# Patient Record
Sex: FEMALE | Race: WHITE | NOT HISPANIC OR LATINO
[De-identification: names, ages, dates, MRNs, and addresses within clinical notes are randomized per-mention and may not be internally consistent; named-entity substitution may affect disease eponyms.]

---

## 2018-05-21 PROBLEM — Z00.00 ENCOUNTER FOR PREVENTIVE HEALTH EXAMINATION: Status: ACTIVE | Noted: 2018-05-21

## 2018-05-23 ENCOUNTER — APPOINTMENT (OUTPATIENT)
Dept: SURGERY | Facility: CLINIC | Age: 83
End: 2018-05-23

## 2020-02-09 ENCOUNTER — TRANSCRIPTION ENCOUNTER (OUTPATIENT)
Age: 85
End: 2020-02-09

## 2020-02-09 ENCOUNTER — INPATIENT (INPATIENT)
Facility: HOSPITAL | Age: 85
LOS: 0 days | Discharge: HOME HEALTH | DRG: 202 | End: 2020-02-10
Attending: INTERNAL MEDICINE | Admitting: INTERNAL MEDICINE
Payer: MEDICARE

## 2020-02-09 VITALS
RESPIRATION RATE: 22 BRPM | OXYGEN SATURATION: 93 % | TEMPERATURE: 98 F | DIASTOLIC BLOOD PRESSURE: 71 MMHG | WEIGHT: 138.89 LBS | HEIGHT: 59 IN | HEART RATE: 67 BPM | SYSTOLIC BLOOD PRESSURE: 131 MMHG

## 2020-02-09 DIAGNOSIS — Z29.9 ENCOUNTER FOR PROPHYLACTIC MEASURES, UNSPECIFIED: ICD-10-CM

## 2020-02-09 DIAGNOSIS — J06.9 ACUTE UPPER RESPIRATORY INFECTION, UNSPECIFIED: ICD-10-CM

## 2020-02-09 DIAGNOSIS — Z95.0 PRESENCE OF CARDIAC PACEMAKER: ICD-10-CM

## 2020-02-09 DIAGNOSIS — E78.5 HYPERLIPIDEMIA, UNSPECIFIED: ICD-10-CM

## 2020-02-09 DIAGNOSIS — Z95.0 PRESENCE OF CARDIAC PACEMAKER: Chronic | ICD-10-CM

## 2020-02-09 DIAGNOSIS — E03.9 HYPOTHYROIDISM, UNSPECIFIED: ICD-10-CM

## 2020-02-09 DIAGNOSIS — I10 ESSENTIAL (PRIMARY) HYPERTENSION: ICD-10-CM

## 2020-02-09 DIAGNOSIS — I50.9 HEART FAILURE, UNSPECIFIED: ICD-10-CM

## 2020-02-09 DIAGNOSIS — I48.91 UNSPECIFIED ATRIAL FIBRILLATION: ICD-10-CM

## 2020-02-09 LAB
ALBUMIN SERPL ELPH-MCNC: 2.8 G/DL — LOW (ref 3.3–5)
ALP SERPL-CCNC: 122 U/L — HIGH (ref 40–120)
ALT FLD-CCNC: 26 U/L — SIGNIFICANT CHANGE UP (ref 12–78)
ANION GAP SERPL CALC-SCNC: 6 MMOL/L — SIGNIFICANT CHANGE UP (ref 5–17)
APTT BLD: 29.8 SEC — SIGNIFICANT CHANGE UP (ref 28.5–37)
AST SERPL-CCNC: 39 U/L — HIGH (ref 15–37)
BASOPHILS # BLD AUTO: 0 K/UL — SIGNIFICANT CHANGE UP (ref 0–0.2)
BASOPHILS NFR BLD AUTO: 0 % — SIGNIFICANT CHANGE UP (ref 0–2)
BILIRUB SERPL-MCNC: 1 MG/DL — SIGNIFICANT CHANGE UP (ref 0.2–1.2)
BUN SERPL-MCNC: 19 MG/DL — SIGNIFICANT CHANGE UP (ref 7–23)
CALCIUM SERPL-MCNC: 9.1 MG/DL — SIGNIFICANT CHANGE UP (ref 8.5–10.1)
CHLORIDE SERPL-SCNC: 103 MMOL/L — SIGNIFICANT CHANGE UP (ref 96–108)
CK SERPL-CCNC: 58 U/L — SIGNIFICANT CHANGE UP (ref 26–192)
CO2 SERPL-SCNC: 29 MMOL/L — SIGNIFICANT CHANGE UP (ref 22–31)
CREAT SERPL-MCNC: 1.1 MG/DL — SIGNIFICANT CHANGE UP (ref 0.5–1.3)
EOSINOPHIL # BLD AUTO: 0 K/UL — SIGNIFICANT CHANGE UP (ref 0–0.5)
EOSINOPHIL NFR BLD AUTO: 0 % — SIGNIFICANT CHANGE UP (ref 0–6)
FLU A RESULT: SIGNIFICANT CHANGE UP
FLU A RESULT: SIGNIFICANT CHANGE UP
FLUAV AG NPH QL: SIGNIFICANT CHANGE UP
FLUBV AG NPH QL: SIGNIFICANT CHANGE UP
GLUCOSE SERPL-MCNC: 94 MG/DL — SIGNIFICANT CHANGE UP (ref 70–99)
HCT VFR BLD CALC: 44.3 % — SIGNIFICANT CHANGE UP (ref 34.5–45)
HGB BLD-MCNC: 13.9 G/DL — SIGNIFICANT CHANGE UP (ref 11.5–15.5)
INR BLD: 1.34 RATIO — HIGH (ref 0.88–1.16)
LACTATE SERPL-SCNC: 1.4 MMOL/L — SIGNIFICANT CHANGE UP (ref 0.7–2)
LYMPHOCYTES # BLD AUTO: 0.76 K/UL — LOW (ref 1–3.3)
LYMPHOCYTES # BLD AUTO: 15 % — SIGNIFICANT CHANGE UP (ref 13–44)
MANUAL SMEAR VERIFICATION: SIGNIFICANT CHANGE UP
MCHC RBC-ENTMCNC: 29.5 PG — SIGNIFICANT CHANGE UP (ref 27–34)
MCHC RBC-ENTMCNC: 31.4 GM/DL — LOW (ref 32–36)
MCV RBC AUTO: 94.1 FL — SIGNIFICANT CHANGE UP (ref 80–100)
MONOCYTES # BLD AUTO: 0.76 K/UL — SIGNIFICANT CHANGE UP (ref 0–0.9)
MONOCYTES NFR BLD AUTO: 15 % — HIGH (ref 2–14)
NEUTROPHILS # BLD AUTO: 3.56 K/UL — SIGNIFICANT CHANGE UP (ref 1.8–7.4)
NEUTROPHILS NFR BLD AUTO: 67 % — SIGNIFICANT CHANGE UP (ref 43–77)
NEUTS BAND # BLD: 3 % — SIGNIFICANT CHANGE UP (ref 0–8)
NRBC # BLD: 0 — SIGNIFICANT CHANGE UP
NRBC # BLD: SIGNIFICANT CHANGE UP /100 WBCS (ref 0–0)
NT-PROBNP SERPL-SCNC: 1454 PG/ML — HIGH (ref 0–450)
PLAT MORPH BLD: NORMAL — SIGNIFICANT CHANGE UP
PLATELET # BLD AUTO: 179 K/UL — SIGNIFICANT CHANGE UP (ref 150–400)
POTASSIUM SERPL-MCNC: 4.6 MMOL/L — SIGNIFICANT CHANGE UP (ref 3.5–5.3)
POTASSIUM SERPL-SCNC: 4.6 MMOL/L — SIGNIFICANT CHANGE UP (ref 3.5–5.3)
PROT SERPL-MCNC: 7.7 G/DL — SIGNIFICANT CHANGE UP (ref 6–8.3)
PROTHROM AB SERPL-ACNC: 15.2 SEC — HIGH (ref 10–12.9)
RBC # BLD: 4.71 M/UL — SIGNIFICANT CHANGE UP (ref 3.8–5.2)
RBC # FLD: 14.8 % — HIGH (ref 10.3–14.5)
RBC BLD AUTO: NORMAL — SIGNIFICANT CHANGE UP
RSV RESULT: SIGNIFICANT CHANGE UP
RSV RNA RESP QL NAA+PROBE: SIGNIFICANT CHANGE UP
SODIUM SERPL-SCNC: 138 MMOL/L — SIGNIFICANT CHANGE UP (ref 135–145)
TROPONIN I SERPL-MCNC: 0.02 NG/ML — SIGNIFICANT CHANGE UP (ref 0.01–0.04)
WBC # BLD: 5.08 K/UL — SIGNIFICANT CHANGE UP (ref 3.8–10.5)
WBC # FLD AUTO: 5.08 K/UL — SIGNIFICANT CHANGE UP (ref 3.8–10.5)

## 2020-02-09 PROCEDURE — 71045 X-RAY EXAM CHEST 1 VIEW: CPT | Mod: 26

## 2020-02-09 PROCEDURE — 99285 EMERGENCY DEPT VISIT HI MDM: CPT

## 2020-02-09 PROCEDURE — 93010 ELECTROCARDIOGRAM REPORT: CPT

## 2020-02-09 RX ORDER — FUROSEMIDE 40 MG
40 TABLET ORAL DAILY
Refills: 0 | Status: DISCONTINUED | OUTPATIENT
Start: 2020-02-09 | End: 2020-02-10

## 2020-02-09 RX ORDER — FOLIC ACID 0.8 MG
1 TABLET ORAL DAILY
Refills: 0 | Status: DISCONTINUED | OUTPATIENT
Start: 2020-02-09 | End: 2020-02-10

## 2020-02-09 RX ORDER — FUROSEMIDE 40 MG
1 TABLET ORAL
Qty: 0 | Refills: 0 | DISCHARGE

## 2020-02-09 RX ORDER — DILTIAZEM HCL 120 MG
120 CAPSULE, EXT RELEASE 24 HR ORAL DAILY
Refills: 0 | Status: DISCONTINUED | OUTPATIENT
Start: 2020-02-09 | End: 2020-02-09

## 2020-02-09 RX ORDER — METOPROLOL TARTRATE 50 MG
200 TABLET ORAL DAILY
Refills: 0 | Status: DISCONTINUED | OUTPATIENT
Start: 2020-02-09 | End: 2020-02-09

## 2020-02-09 RX ORDER — FUROSEMIDE 40 MG
40 TABLET ORAL DAILY
Refills: 0 | Status: DISCONTINUED | OUTPATIENT
Start: 2020-02-09 | End: 2020-02-09

## 2020-02-09 RX ORDER — DILTIAZEM HCL 120 MG
0 CAPSULE, EXT RELEASE 24 HR ORAL
Qty: 0 | Refills: 0 | DISCHARGE

## 2020-02-09 RX ORDER — LEVOTHYROXINE SODIUM 125 MCG
0 TABLET ORAL
Qty: 0 | Refills: 0 | DISCHARGE

## 2020-02-09 RX ORDER — FOLIC ACID 0.8 MG
0 TABLET ORAL
Qty: 0 | Refills: 0 | DISCHARGE

## 2020-02-09 RX ORDER — APIXABAN 2.5 MG/1
0 TABLET, FILM COATED ORAL
Qty: 0 | Refills: 0 | DISCHARGE

## 2020-02-09 RX ORDER — APIXABAN 2.5 MG/1
2.5 TABLET, FILM COATED ORAL EVERY 12 HOURS
Refills: 0 | Status: DISCONTINUED | OUTPATIENT
Start: 2020-02-09 | End: 2020-02-10

## 2020-02-09 RX ORDER — LEVOTHYROXINE SODIUM 125 MCG
100 TABLET ORAL DAILY
Refills: 0 | Status: DISCONTINUED | OUTPATIENT
Start: 2020-02-09 | End: 2020-02-10

## 2020-02-09 RX ORDER — FUROSEMIDE 40 MG
0 TABLET ORAL
Qty: 0 | Refills: 0 | DISCHARGE

## 2020-02-09 RX ORDER — METOPROLOL TARTRATE 50 MG
1 TABLET ORAL
Qty: 0 | Refills: 0 | DISCHARGE

## 2020-02-09 RX ORDER — OMEPRAZOLE 10 MG/1
1 CAPSULE, DELAYED RELEASE ORAL
Qty: 0 | Refills: 0 | DISCHARGE

## 2020-02-09 RX ORDER — ALBUTEROL 90 UG/1
1 AEROSOL, METERED ORAL EVERY 6 HOURS
Refills: 0 | Status: DISCONTINUED | OUTPATIENT
Start: 2020-02-09 | End: 2020-02-10

## 2020-02-09 RX ORDER — FUROSEMIDE 40 MG
40 TABLET ORAL ONCE
Refills: 0 | Status: COMPLETED | OUTPATIENT
Start: 2020-02-09 | End: 2020-02-09

## 2020-02-09 RX ORDER — LOVASTATIN 20 MG
0 TABLET ORAL
Qty: 0 | Refills: 0 | DISCHARGE

## 2020-02-09 RX ORDER — SODIUM CHLORIDE 9 MG/ML
3 INJECTION INTRAMUSCULAR; INTRAVENOUS; SUBCUTANEOUS ONCE
Refills: 0 | Status: COMPLETED | OUTPATIENT
Start: 2020-02-09 | End: 2020-02-09

## 2020-02-09 RX ORDER — LEVOTHYROXINE SODIUM 125 MCG
1 TABLET ORAL
Qty: 0 | Refills: 0 | DISCHARGE

## 2020-02-09 RX ORDER — FOLIC ACID 0.8 MG
1 TABLET ORAL
Qty: 0 | Refills: 0 | DISCHARGE

## 2020-02-09 RX ORDER — ATORVASTATIN CALCIUM 80 MG/1
10 TABLET, FILM COATED ORAL AT BEDTIME
Refills: 0 | Status: DISCONTINUED | OUTPATIENT
Start: 2020-02-09 | End: 2020-02-10

## 2020-02-09 RX ORDER — METOPROLOL TARTRATE 50 MG
200 TABLET ORAL DAILY
Refills: 0 | Status: DISCONTINUED | OUTPATIENT
Start: 2020-02-09 | End: 2020-02-10

## 2020-02-09 RX ORDER — PANTOPRAZOLE SODIUM 20 MG/1
40 TABLET, DELAYED RELEASE ORAL
Refills: 0 | Status: DISCONTINUED | OUTPATIENT
Start: 2020-02-09 | End: 2020-02-10

## 2020-02-09 RX ORDER — IPRATROPIUM/ALBUTEROL SULFATE 18-103MCG
3 AEROSOL WITH ADAPTER (GRAM) INHALATION EVERY 6 HOURS
Refills: 0 | Status: DISCONTINUED | OUTPATIENT
Start: 2020-02-09 | End: 2020-02-09

## 2020-02-09 RX ORDER — POTASSIUM CHLORIDE 20 MEQ
1 PACKET (EA) ORAL
Qty: 0 | Refills: 0 | DISCHARGE

## 2020-02-09 RX ORDER — IPRATROPIUM/ALBUTEROL SULFATE 18-103MCG
3 AEROSOL WITH ADAPTER (GRAM) INHALATION THREE TIMES A DAY
Refills: 0 | Status: DISCONTINUED | OUTPATIENT
Start: 2020-02-09 | End: 2020-02-10

## 2020-02-09 RX ORDER — DILTIAZEM HCL 120 MG
120 CAPSULE, EXT RELEASE 24 HR ORAL DAILY
Refills: 0 | Status: DISCONTINUED | OUTPATIENT
Start: 2020-02-09 | End: 2020-02-10

## 2020-02-09 RX ADMIN — Medication 600 MILLIGRAM(S): at 21:22

## 2020-02-09 RX ADMIN — SODIUM CHLORIDE 3 MILLILITER(S): 9 INJECTION INTRAMUSCULAR; INTRAVENOUS; SUBCUTANEOUS at 16:00

## 2020-02-09 RX ADMIN — ATORVASTATIN CALCIUM 10 MILLIGRAM(S): 80 TABLET, FILM COATED ORAL at 22:39

## 2020-02-09 RX ADMIN — Medication 3 MILLILITER(S): at 21:22

## 2020-02-09 RX ADMIN — Medication 125 MILLIGRAM(S): at 20:51

## 2020-02-09 RX ADMIN — Medication 40 MILLIGRAM(S): at 17:22

## 2020-02-09 RX ADMIN — APIXABAN 2.5 MILLIGRAM(S): 2.5 TABLET, FILM COATED ORAL at 21:15

## 2020-02-09 RX ADMIN — Medication 100 MILLIGRAM(S): at 23:05

## 2020-02-09 NOTE — ED PROVIDER NOTE - OBJECTIVE STATEMENT
89 yo F with hx CHF p/w cough, congestion x past ~ 4 days. Now with inc dyspnea today, wheezing. no chest pain. no abd pain . no n/v/d./ no neck / back pain. no recent trauma. no recent travel / immob. Pt seen by MD montero and started on amoxicillin, now with worsening sx. no recent trauma. no recent travel / immob. no agg/allev factors. NO other inj or co.

## 2020-02-09 NOTE — CONSULT NOTE ADULT - SUBJECTIVE AND OBJECTIVE BOX
[facility] Cardiology    CHIEF COMPLAINT: Patient is a 89y old  Female  PMH AFIB on Eliquis, HTN CHOL, diastolic chf who presents with a chief complaint of cough, phlegm, colored sputum x 2 days. Denies CP or pressure. Recently started on Amoxicillin 2 days ago from PMD for URI symptoms. Pt did not take her lasix dose today (has been taking lasix 40 mg MWFS, 20 mg TTS).    HPI:      PAST MEDICAL & SURGICAL HISTORY:  HLD (hyperlipidemia)  HTN (hypertension)  Afib  CHF (congestive heart failure)  Artificial pacemaker    SOCIAL HISTORY: no tobacco    FAMILY HISTORY: n/c      MEDICATIONS  (STANDING):  furosemide   Injectable 40 milliGRAM(s) IV Push Once    MEDICATIONS  (PRN):      Allergies    No Known Allergies    Intolerances      REVIEW OF SYSTEMS:  CONSTITUTIONAL:  weakness, no fevers   EYES/ENT: No visual changes  NECK: No pain or stiffness  RESPIRATORY: shortness of breath  CARDIOVASCULAR: No chest pain or palpitations  GASTROINTESTINAL: No abdominal pain  GENITOURINARY: No hematuria  NEUROLOGICAL: No weakness  SKIN: No rash  All other review of systems is negative unless indicated above    VITAL SIGNS:   Vital Signs Last 24 Hrs  T(C): 36.9 (09 Feb 2020 16:25), Max: 36.9 (09 Feb 2020 16:25)  T(F): 98.5 (09 Feb 2020 16:25), Max: 98.5 (09 Feb 2020 16:25)  HR: 65 (09 Feb 2020 16:25) (65 - 67)  BP: 117/70 (09 Feb 2020 16:25) (117/70 - 131/71)  BP(mean): --  RR: 24 (09 Feb 2020 16:25) (22 - 24)  SpO2: 95% (09 Feb 2020 16:25) (93% - 95%)  I&O's Summary    PHYSICAL EXAM:  Constitutional: NAD  Neurological: Alert and oriented  HEENT: EOMI, no JVD  Cardiovascular: S1 and S2 irr  Pulmonary: breath sounds bilaterally coarse rhonchi, wheeze b/l L>R  Gastrointestinal: Bowel Sounds present, soft, nontender  Ext: peripheral edema neg  Skin: No rashes, No cyanosis.  Psych:  Mood & affect appropriate   LABS: All Labs Reviewed:                        13.9   5.08  )-----------( 179      ( 09 Feb 2020 16:17 )             44.3     02-09    138  |  103  |  19  ----------------------------<  94  4.6   |  29  |  1.10    Ca    9.1      09 Feb 2020 16:17    TPro  7.7  /  Alb  2.8<L>  /  TBili  1.0  /  DBili  x   /  AST  39<H>  /  ALT  26  /  AlkPhos  122<H>  02-09    PT/INR - ( 09 Feb 2020 16:17 )   PT: 15.2 sec;   INR: 1.34 ratio       CHIEF COMPLAINT: Patient is a 89y old  Female  PMH AFIB on Eliquis, HTN CHOL, diastolic chf who presents with a chief complaint of cough, phlegm, colored sputum x 2 days. Denies CP or pressure. Recently started on Amoxicillin 2 days ago from PMD for URI symptoms. Pt did not take her lasix dose today (has been taking lasix 40 mg MWFS, 20 mg TTS).    PTT - ( 09 Feb 2020 16:17 )  PTT:29.8 sec  CARDIAC MARKERS ( 09 Feb 2020 16:17 )  .016 ng/mL / x     / 58 U/L / x     / x        Blood Culture:   02-09 @ 16:17  Pro Bnp 1454    RADIOLOGY/EKG: AF with chronic ST-T wave abnormalities, septal infarct pattern t waves chronic    ? PNA with superimposed chf  PLAN  would give 40 mg IV lasix x1  F/U CXR  Pan CX  Pt well known to our office/ Dr. Rogers  Pt appears reluctant to stay but I recommend admission

## 2020-02-09 NOTE — H&P ADULT - HISTORY OF PRESENT ILLNESS
89F w/pmh of Afib on Eliquis, CHF, PPM, HTN, HLD presenting with cough, congestion and wheezing x 4 days.     In the ED, patient's vitals were: T97.5F, HR 67, /71, RR 22 and SpO2 93% RA. Significant labs include: cbc, cmp wnl. BNP 1454. Flu/RSV negative. Trop .016  Pt received: Solumedrol 125mg IV x1, Lasix 40mg IVx1. Blood cx collected.   CXR:   EKG: 89F w/pmh of Afib on Eliquis, CHF, s/p PPM, HTN, HLD presenting with cough, congestion and wheezing x 4 days. Patient states that since Wednesday she has been having a runny nose and productive cough that has been progressively getting worse.  Patient has allergic rhinitis however states that this was different as her nose was leaking green colored discharge.  She then went to her PCP and was prescribed amoxicillin and tessalon perles which she has been taking for 1 day however this morning, daughter at bedside states she was more weak and wheezing therefore they decided to bring her in.  Patient has cough baseline however this is worse and productive of sputum and states that she feels like something is stuck in her throat.  Denies recent travel, post nasal drip, chest pain, SOB (other than baseline), abd pain, diarrhea/ constipation, nausea/ vomiting, muscle aches/ pains or sick contacts.  Patient admits to some chest discomfort a few days ago when she was getting up from chair but feels that she was straining herself and it resolved, has not had pain since then.  Denies chest pain or SOB with ambulation.  She is able to sleep flat without orthopnea however sleeps on sides as it is more comfortable.  She is on lasix at home, however did not take her dose today.  Family states that she likes chinese food and junk food however for the past month has been very diligent about what she is eating and maintaining low salt diet.  Patient also states that about 3 weeks ago, her legs "buckled" and she fell on her b/l knees, was able to get up and walk around, since then has been getting PT and now has [facility] set up.      In the ED, patient's vitals were: T97.5F, HR 67, /71, RR 22 and SpO2 93% RA. Significant labs include: cbc, cmp wnl. BNP 1454. Flu/RSV negative. Trop .016  Pt received: Solumedrol 125mg IV x1, Lasix 40mg IVx1. Blood cx collected.   CXR: blunting of L costophrenic angle unchanged from before, RLL haziness present unchanged from before (wet read)  EKG: NSR with old ST abnormalities and TWI and regular PVCs 89F w/pmh of Afib on Eliquis, CHF, s/p PPM, HTN, HLD, hypothyroidism presenting with cough, congestion and wheezing x 4 days. Patient states that since Wednesday she has been having a runny nose and productive cough that has been progressively getting worse.  Patient has allergic rhinitis however states that this was different as her nose was leaking green colored discharge.  She then went to her PCP and was prescribed amoxicillin and tessalon perles which she has been taking for 1 day however this morning, daughter at bedside states she was more weak and wheezing therefore they decided to bring her in.  Patient has cough baseline however this is worse and productive of sputum and states that she feels like something is stuck in her throat.  Denies recent travel, post nasal drip, chest pain, SOB (other than baseline), abd pain, diarrhea/ constipation, nausea/ vomiting, muscle aches/ pains or sick contacts.  Patient admits to some chest discomfort a few days ago when she was getting up from chair but feels that she was straining herself and it resolved, has not had pain since then.  Denies chest pain or SOB with ambulation.  She is able to sleep flat without orthopnea however sleeps on sides as it is more comfortable.  She is on lasix at home, however did not take her dose today.  Family states that she likes chinese food and junk food however for the past month has been very diligent about what she is eating and maintaining low salt diet.  Patient also states that about 3 weeks ago, her legs "buckled" and she fell on her b/l knees, was able to get up and walk around, since then has been getting PT and now has [facility] set up.      In the ED, patient's vitals were: T97.5F, HR 67, /71, RR 22 and SpO2 93% RA. Significant labs include: cbc, cmp wnl. BNP 1454. Flu/RSV negative. Trop .016  Pt received: Solumedrol 125mg IV x1, Lasix 40mg IVx1. Blood cx collected.   CXR: blunting of L costophrenic angle unchanged from before, RLL haziness present unchanged from before (wet read)  EKG: NSR with old ST abnormalities and TWI and regular PVCs 89F w/pmh of Afib on Eliquis,  Diastolic CHF, s/p PPM, HTN, HLD, hypothyroidism presenting with cough, congestion and wheezing x 4 days. Patient states that since Wednesday she has been having a runny nose and productive cough that has been progressively getting worse.  Patient has allergic rhinitis however states that this was different as her nose was leaking green colored discharge.  She then went to her PCP and was prescribed amoxicillin and tessalon pearls which she has been taking for 1 day however this morning, daughter at bedside states she was more weak and wheezing therefore they decided to bring her in.  Patient has cough baseline however this is worse and productive of sputum and states that she feels like something is stuck in her throat.  Denies recent travel, post nasal drip, chest pain, SOB (other than baseline), abd pain, diarrhea/ constipation, nausea/ vomiting, muscle aches/ pains or sick contacts.  Patient admits to some chest discomfort a few days ago when she was getting up from chair but feels that she was straining herself and it resolved, has not had pain since then.  Denies chest pain or SOB with ambulation.  She is able to sleep flat without orthopnea however sleeps on sides as it is more comfortable.  She is on lasix at home, however did not take her dose today.  Family states that she likes chinese food and junk food however for the past month has been very diligent about what she is eating and maintaining low salt diet.  Patient also states that about 3 weeks ago, her legs "buckled" and she fell on her b/l knees, was able to get up and walk around, since then has been getting PT and now has [facility] set up.      In the ED, patient's vitals were: T97.5F, HR 67, /71, RR 22 and SpO2 93% RA. Significant labs include: cbc, cmp wnl. BNP 1454. Flu/RSV negative. Trop .016  Pt received: Solumedrol 125mg IV x1, Lasix 40mg IVx1. Blood cx collected.   CXR: blunting of L costophrenic angle unchanged from before, RLL haziness present unchanged from before (wet read)  EKG: NSR with old ST abnormalities and TWI and regular PVCs 89F w/pmh of Afib on Eliquis,  Diastolic CHF, s/p PPM, HTN, HLD, hypothyroidism, Hx of ERCP and sphincterotomy (2018) for multiple stones, duodenal cyst presenting with cough, congestion and wheezing x 4 days. Patient states that since Wednesday she has been having a runny nose and productive cough that has been progressively getting worse.  Patient has allergic rhinitis however states that this was different as her nose was leaking green colored discharge.  She then went to her PCP and was prescribed amoxicillin and tessalon pearls which she has been taking for 1 day however this morning, daughter at bedside states she was more weak and wheezing therefore they decided to bring her in.  Patient has cough baseline however this is worse and productive of sputum and states that she feels like something is stuck in her throat.  Denies recent travel, post nasal drip, chest pain, SOB (other than baseline), abd pain, diarrhea/ constipation, nausea/ vomiting, muscle aches/ pains or sick contacts.  Patient admits to some chest discomfort a few days ago when she was getting up from chair but feels that she was straining herself and it resolved, has not had pain since then.  Denies chest pain or SOB with ambulation.  She is able to sleep flat without orthopnea however sleeps on sides as it is more comfortable.  She is on lasix at home, however did not take her dose today.  Family states that she likes chinese food and junk food however for the past month has been very diligent about what she is eating and maintaining low salt diet.  Patient also states that about 3 weeks ago, her legs "buckled" and she fell on her b/l knees, was able to get up and walk around, since then has been getting PT and now has [facility] set up.      Dr. Garcia, patient's outpatient gastroenterologist was called. Patient has had ERCP with sphincterotomy for multiple stones in 2018. Patient also has a history of duodenal diverticulum.       In the ED, patient's vitals were: T97.5F, HR 67, /71, RR 22 and SpO2 93% RA. Significant labs include: cbc, cmp wnl. BNP 1454. Flu/RSV negative. Trop .016  Pt received: Solumedrol 125mg IV x1, Lasix 40mg IVx1. Blood cx collected.   CXR: cardiomegaly; clear lungs  CT Chest Non-contrast (2/10): 1. 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 No rupture. 2. Mild multifocal bronchiolitis in the right lung. 3. Pneumobilia. Correlate for history of ERCP with sphincterotomy or similar biliary intervention. In the absence of such history the presence of gas in the bile ducts may signify cholangitis or juanita-enteric fistula. 4. There is a fluid attenuation lesion at/near the expected position of the common bile duct which may be dilated common bile ducts/choledochal cyst or could be a duodenal diverticulum. EKG: NSR with old ST abnormalities and TWI and regular PVCs.

## 2020-02-09 NOTE — H&P ADULT - PROBLEM SELECTOR PLAN 4
- continue with home Eliquis 2.5mg BID  - continue with home diltiazem 120mg ER and metoprolol succinate 200mg QD with hold parameters  - currently not in afib when admitted  - continue tele monitoring - On cardizem  mg daily, Toprol  mg daily  - continue to monitor routine hemodynamics, well controlled

## 2020-02-09 NOTE — H&P ADULT - ASSESSMENT
89F w/pmh of Afib on Eliquis, CHF, PPM, HTN, HLD presenting with cough, congestion and wheezing admitted with 89F PMH of Afib on Eliquis, CHF, PPM, HTN, HLD presenting with cough, congestion and wheezing admitted with CHF exacerbation and viral infection, with failed outpatient abx. 89F PMH of Afib on Eliquis, CHF, PPM, HTN, HLD, hypothyroidism presenting with cough, congestion and wheezing admitted with CHF exacerbation and viral infection, with failed outpatient abx, r/o PNA. 89F PMH of Afib on Eliquis, CHF, PPM, HTN, HLD, hypothyroidism presenting with cough, congestion and wheezing admitted with Ac on Chronic Diastolic CHF exacerbation and possible viral infection, with failed outpatient abx, r/o PNA.

## 2020-02-09 NOTE — ED PROVIDER NOTE - CARE PLAN
Principal Discharge DX:	Upper respiratory tract infection, unspecified type  Secondary Diagnosis:	Congestive heart failure, unspecified HF chronicity, unspecified heart failure type

## 2020-02-09 NOTE — H&P ADULT - NSHPOUTPATIENTPROVIDERS_GEN_ALL_CORE
[facility]monary Medicine    [address] [address] [place] [id number]    Phone:  [phone]    Fax:  [phone]       Thank You for choosing us for your health care visit. We are glad to serve you and happy to provide you with this summary of your visit. Please help us to ensure we have accurate records. If you find anything that needs to be changed, please let our staff know as soon as possible.          Your Demographic Information     Patient Name Sex Aram Jack Female 1955       Ethnic Group Patient Race    Not of  or  Origin White      Your Visit Details     Date & Time Provider Department    2017 10:30 AM Armando Huizar DO [facility]      Your Upcoming Appointment*(Max 10)     2017 11:00 AM CST   COMPLETE PFT with CANDELARIO PFT   [facility] ([facility])    [address] [place] [id number]   [phone]            2017  8:40 AM CST   Lab Visit with JONI LAB   [facility] ([facility])    [address] [place] [address] [phone]            2017  8:30 AM CDT   Consultation with Siddhartha Martell DO   [facility] ([facility])    [address] [place] [id number] [phone]            Wednesday May 03, 2017  9:45 AM CDT   Follow-up Visit with Armando Huizar DO   [facility] ([facility])    [address] [place] [id number] [phone]            2017  8:00 AM CDT   Follow-up Visit with KENDALL Edouard   [facility] ([facility])    [address] [place] [address] [phone]              Your To Do List     Follow-Up    Return in about 3 months (around 2017) for asthma, PFTs.      We Ordered or Performed the Following     SERVICE TO PULMONARY TESTING       Conditions Discussed  PCP: Carri Moses [phone] Today or Order-Related Diagnoses        Comments    Persistent asthma with undetermined severity    -  Primary     Shortness of breath         Wheezing         Uncontrolled persistent asthma         Obesity (BMI 30-39.9)           Your Vitals Were     BP Pulse Resp Height Weight SpO2    120/78 78 16 5' 5\" (1.651 m) 240 lb (108.9 kg) 98%    BMI Smoking Status                39.94 kg/m2 Former Smoker          Medications Prescribed or Re-Ordered Today     fluticasone-salmeterol (ADVAIR DISKUS) 250-50 MCG/DOSE inhaler    Sig - Route: Inhale 1 puff into the lungs 2 times daily. - Inhalation    Class: Eprescribe    [facility] [id number] [place] [address] AT [facility] Ph #: [phone]      Your Current Medications Are        Disp Refills Start End    montelukast (SINGULAIR) 10 MG tablet        Sig - Route: Take 10 mg by mouth nightly. - Oral    Class: Historical Med    escitalopram (LEXAPRO) 20 MG tablet 45 tablet 2 1/16/2017     Sig - Route: Take 1.5 tablets by mouth daily. - Oral    Class: Eprescribe    fluticasone-salmeterol (ADVAIR DISKUS) 100-50 MCG/DOSE AEROSOL POWDER, BREATH ACTIVATED 1 each 5 10/31/2016     Sig - Route: Inhale 1 puff into the lungs 2 times daily. - Inhalation    Class: Eprescribe    albuterol (PROVENTIL HFA) 108 (90 BASE) MCG/ACT inhaler 2 Inhaler 3 10/15/2015     Sig - Route: Inhale 2 puffs into the lungs every 4 hours as needed for Shortness of Breath or Wheezing. - Inhalation    Class: Eprescribe    aspirin 81 MG tablet        Sig - Route: Take 81 mg by mouth daily. - Oral    Class: Historical Med    fluticasone-salmeterol (ADVAIR DISKUS) 250-50 MCG/DOSE inhaler 1 each 11 1/30/2017 1/30/2018    Sig - Route: Inhale 1 puff into the lungs 2 times daily. - Inhalation    Class: Eprescribe    predniSONE (DELTASONE) 20 MG tablet 30 tablet 2 10/31/2016     Sig: Take 2 tabs po qd for 5 days then 1 tab po qd for 5 days when having an asthma flare/    Class:  Eprescribe      Allergies     Claritin CARDIAC DISTURBANCES    Rapid heart beat    Vicodin Es [Hydrocodone-acetaminophen] MYALGIA    Penicillins HIVES      Immunizations History as of 1/30/2017     Name Date    INFLUENZA QUADRIVALENT 10/31/2016, 10/14/2014    Influenza 10/30/2008    Influenza Quadrivalent Preservative Free 10/15/2015    Pneumococcal Polysaccharide Adult 8/17/2011    Tdap 8/17/2011      Problem List as of 1/30/2017     HLD (hyperlipidemia)    Diverticulitis of colon (without mention of hemorrhage)    Unspecified constipation    Unspecified hemorrhoids without mention of complication              Patient Instructions        PULMONARY  FUNCTION TESTING  PATIENT INSTRUCTIONS    Appointment:   1/31/2017 at 11:00 am  Please arrive at registration at 10:45 am    No inhaler/dilator use 4 hours prior to the exam.    DO NOT use the following medications prior to the exam:        Advair    24 hours prior      Albuterol/Ventolin   6 hours prior      Singulair   24 hours prior        No smoking at least 4 hours prior to the test.    Please check in at the Clinic Registration first, then proceed to the Radiology Desk.    If you are unable to keep your appointment or need to reschedule, please call   scheduling at [phone].        Pulmonary Function Tests  Pulmonary function tests (also called lung function tests) help measure how well your lungs are working. The tests measure the amount of air you breathe out (exhale) and how long it takes for you to exhale completely. These tests are done to diagnose lung conditions such as asthma and COPD. They may be done before and after you take certain medications. They may also be used to find out whether your shortness of breath gets worse with exercise. Over time, pulmonary function tests can help you and your healthcare providers see how well your treatment is working.     Spirometry measures how much air you can take into your lungs and how fast you can blow the  air out.   Your Experience  A complete pulmonary function test has 3 parts. You may be given the entire test or only certain parts. The entire test is painless and can last 45-90 minutes. If you get tired, you can take a break between test sections.  Before Your Test  Follow any instructions you are given to prepare for the test. Otherwise, your test may be canceled.  · Stop smoking for 8-12 hours before the test.  · Stop taking your breathing medication 4-24 hours before the test.  · Avoid caffeine and eat only a light meal. Also, limit the amount of fluids you drink.  · Wear loose clothes that don’t restrict your breathing.  Tell the healthcare provider if you have any of these symptoms during the test:  · Sore mouth  · Chest, arm, or jaw pain  · Fatigue or dizziness  · Severe shortness of breath  During Your Test  The healthcare provider will  you during your test. Depending on how many parts of the test you have, you may sit in a chair and breathe through a mouthpiece. Or you may sit in a clear plastic box that looks like a phone carter. You will wear nose clips so you only breathe through your mouth.  · During spirometry, you hold your breath and blow it out fast. Spirometry is repeated at least 3 times to measure your best effort.  · During diffusion, you hold your breath for 10 seconds. The test measures how well your lungs move air into your blood.  · During lung volume, you breathe in different mixtures of air. How much air you inhale and exhale is measured. The amount of air that stays in your lungs is also measured.  After Your Test  After the test, you can return to your normal diet, activity, and medications. If you were asked to skip medications before the test, ask if you should take them now. Your doctor will discuss the test results with you at your next visit.  Words You May Hear  Pulmonary function tests measure how much air you can exhale, and how quickly. There are several types of pulmonary  function graphs that show data from the tests. Some of the things that tests measure include:  · FVC (forced vital capacity). This is the total amount of air you can exhale in a single, prolonged breath.  · FEV1 (forced expiratory volume in one second). This is the amount of air you exhale in the first second. FEV1 is often expressed as a percentage of FVC.  · FEV1/FVC. This is the amount of air exhaled in the first second compared to the total amount of air exhaled. It’s given as a ratio (fraction) or a percentage. In general, the higher the FEV1/FVC, the better.  · PEF (peak expiratory flow). This is a measure of how fast you can exhale. It can be tested with spirometry or a peak flow meter.   © 4591-9081 [employer]. [address] [place] [address], [place] [address]. All rights reserved. This information is not intended as a substitute for professional medical care. Always follow your healthcare professional's instructions.              PCP: Carri Moses [phone]  Cardio: Dr. Rogers  Podiatry: Dr. Oliver

## 2020-02-09 NOTE — DISCHARGE NOTE PROVIDER - NSDCACTIVITY_GEN_ALL_CORE
No restrictions No restrictions/Showering allowed/Walking - Indoors allowed/No heavy lifting/straining/Bathing allowed/Do not drive or operate machinery

## 2020-02-09 NOTE — H&P ADULT - PROBLEM SELECTOR PLAN 5
- continue with lovastatin 20mg QD - patient on lovastatin 20mg QD at home, continue with therapeutic interchange atorvastatin 10mg QD

## 2020-02-09 NOTE — H&P ADULT - NSICDXPASTSURGICALHX_GEN_ALL_CORE_FT
PAST SURGICAL HISTORY:  Artificial pacemaker PAST SURGICAL HISTORY:  Artificial pacemaker [employer] -last checked  Dec 2019 PAST SURGICAL HISTORY:  Artificial pacemaker [employer] -last checked  Dec 2019    History of ERCP ERCP with sphincterotomyfor multiple stones in 2018

## 2020-02-09 NOTE — H&P ADULT - ATTENDING COMMENTS
Pt seen, examined, case & care plan d/w pt, residents at detail.  AM labs   Follow RVP  PT Jannette  Cardiology DR Delores hilton.

## 2020-02-09 NOTE — H&P ADULT - PROBLEM SELECTOR PLAN 3
- not on any medications for BP  - continue to monitor routine hemodynamics, well controlled - continue with home levothyroxine 100mcg daily - HR stable  -continue with home Eliquis 2.5mg BID  - continue with home diltiazem CD 120mg ER and metoprolol succinate 200mg QD with hold parameters  - currently not in afib when admitted  - continue tele monitoring

## 2020-02-09 NOTE — DISCHARGE NOTE PROVIDER - NSDCMRMEDTOKEN_GEN_ALL_CORE_FT
dilTIAZem 120 mg/12 hours oral capsule, extended release: orally once a day  Eliquis 2.5 mg oral tablet: orally 2 times a day  folic acid: 1  orally once a day  furosemide 20 mg oral tablet: 1 tab(s) orally 3 times a week on TTSat  furosemide 40 mg oral tablet: 1 tab(s) orally 4 times a week on MWFSun  levothyroxine 100 mcg (0.1 mg) oral tablet: 1  orally once a day  lovastatin 20 mg oral tablet: orally once a day  metoprolol succinate 200 mg oral tablet, extended release: 1 tab(s) orally once a day  omeprazole 40 mg oral delayed release capsule: 1 cap(s) orally once a day  potassium chloride 10 mEq oral capsule, extended release: 1 cap(s) orally 4 times a week atorvastatin 10 mg oral tablet: 1 tab(s) orally once a day (at bedtime)  dilTIAZem 120 mg/12 hours oral capsule, extended release: orally once a day  Eliquis 2.5 mg oral tablet: orally 2 times a day  folic acid: 1  orally once a day  furosemide 20 mg oral tablet: 1 tab(s) orally 3 times a week on TTSat  furosemide 40 mg oral tablet: 1 tab(s) orally 4 times a week on MWFSun  ipratropium-albuterol 0.5 mg-2.5 mg/3 mLinhalation solution: 3 milliliter(s) by nebulizer 3 times a day   ipratropium-albuterol 0.5 mg-2.5 mg/3 mLinhalation solution: 3 milliliter(s) inhaled 3 times a day  levothyroxine 100 mcg (0.1 mg) oral tablet: 1  orally once a day  lovastatin 20 mg oral tablet: orally once a day  metoprolol succinate 200 mg oral tablet, extended release: 1 tab(s) orally once a day  omeprazole 40 mg oral delayed release capsule: 1 cap(s) orally once a day  potassium chloride 10 mEq oral capsule, extended release: 1 cap(s) orally 4 times a week dilTIAZem 120 mg/12 hours oral capsule, extended release: orally once a day  Eliquis 2.5 mg oral tablet: orally 2 times a day  folic acid: 1  orally once a day  furosemide 20 mg oral tablet: 1 tab(s) orally 3 times a week on TTSat  furosemide 40 mg oral tablet: 1 tab(s) orally 4 times a week on MWFSun  ipratropium-albuterol 0.5 mg-2.5 mg/3 mLinhalation solution: 3 milliliter(s) by nebulizer 3 times a day   levothyroxine 100 mcg (0.1 mg) oral tablet: 1  orally once a day  lovastatin 20 mg oral tablet: orally once a day  metoprolol succinate 200 mg oral tablet, extended release: 1 tab(s) orally once a day  omeprazole 40 mg oral delayed release capsule: 1 cap(s) orally once a day  potassium chloride 10 mEq oral capsule, extended release: 1 cap(s) orally 4 times a week dilTIAZem 120 mg/12 hours oral capsule, extended release: orally once a day  Eliquis 2.5 mg oral tablet: orally 2 times a day  folic acid: 1  orally once a day  furosemide 20 mg oral tablet: 1 tab(s) orally 3 times a week on TTSat  furosemide 40 mg oral tablet: 1 tab(s) orally 4 times a week on MWFSun  ipratropium-albuterol 0.5 mg-2.5 mg/3 mLinhalation solution: 3 milliliter(s) by nebulizer 3 times a day   levothyroxine 100 mcg (0.1 mg) oral tablet: 1  orally once a day  lovastatin 20 mg oral tablet: orally once a day  metoprolol succinate 200 mg oral tablet, extended release: 1 tab(s) orally once a day  omeprazole 40 mg oral delayed release capsule: 1 cap(s) orally once a day  potassium chloride 10 mEq oral capsule, extended release: 1 cap(s) orally 4 times a week  predniSONE 20 mg oral tablet: 1 tab(s) orally once a day MDD:1  take with Food

## 2020-02-09 NOTE — DISCHARGE NOTE PROVIDER - CARE PROVIDER_API CALL
Carri Moses  Phone: [phone]  Fax: (   )    -  Follow Up Time: 2 weeks ArnaudCarri mccabe  Phone: [phone]  Fax: (   )    -  Follow Up Time: 2 weeks    Khloe Garcia)  Gastroenterology  [address] [place] [address], [place] [address]  Phone: [phone]  Fax: [phone]  Follow Up Time: 2 weeks Khloe Garcia)  Gastroenterology  [address], [address]  [place] [address]  Phone: [phone]  Fax: [phone]  Follow Up Time: 2 weeks    Carri Moses  Phone: [phone]  Fax: (   )    -  Follow Up Time: 2 weeks    Eleni Rogers)  Cardiovascular Disease; Internal Medicine  [address] [place] [address]  Phone: [phone]  Fax: [phone]  Follow Up Time:

## 2020-02-09 NOTE — DISCHARGE NOTE PROVIDER - NSDCCPCAREPLAN_GEN_ALL_CORE_FT
PRINCIPAL DISCHARGE DIAGNOSIS  Diagnosis: Congestive heart failure, unspecified HF chronicity, unspecified heart failure type  Assessment and Plan of Treatment: You were admitted to the [facility] due to congestion and wheezing. You were admitted due to CHF exacerbation, which was confirmed with chest X-ray and blood work. You likely also had a viral upper respiratory illness.. A cardiologist evaluated you while you were in the hospital. You were given IV Lasix.   CT chest showed _____________.      SECONDARY DISCHARGE DIAGNOSES  Diagnosis: Upper respiratory infection  Assessment and Plan of Treatment: You presented to the hospital with likely an upper respiratory infection due to a virus. You were negative for the Flu. You were given supportive breahting treatment, cough suppressants, and Mucinex.    Diagnosis: HLD (hyperlipidemia)  Assessment and Plan of Treatment: Continue your lovastatin 20 mg once per day.    Diagnosis: Afib  Assessment and Plan of Treatment: Continue your Eliquis 2.5 mg twice per day.    Diagnosis: HTN (hypertension)  Assessment and Plan of Treatment: Continue your metoprolol succinate 200 mg once per day, Lasix 40 mg once per day (Monday, Wednesday, Friday, Sunday), Lasix 20 mg once per day (Tuesday, Thursday, Saturday), potassium chloride 10 mEq 4x/week, and diltiazem 120 mg once per day.    Diagnosis: Hypothyroidism  Assessment and Plan of Treatment: Continue your levothyroxine 100 mcg once per day.    Diagnosis: Gastric reflux  Assessment and Plan of Treatment: Continue your omeprazole 40 mg once per day. PRINCIPAL DISCHARGE DIAGNOSIS  Diagnosis: Congestive heart failure, unspecified HF chronicity, unspecified heart failure type  Assessment and Plan of Treatment: You were admitted to the [facility] due to congestion and wheezing. You were admitted due to CHF exacerbation, which was confirmed with chest X-ray and blood work. You likely also had a viral upper respiratory illness.. A cardiologist evaluated you while you were in the hospital. You were given IV Lasix.   A chest CT was performed, which showed a 4.4 cm dilation of the ascending aorta. You should follow this up with a cardiologist.  The CT also showed mild bronchiolitis in the right lung.  The image also showed air in your bile duct. Please follow this up with a gastroenterologist.         SECONDARY DISCHARGE DIAGNOSES  Diagnosis: Upper respiratory infection  Assessment and Plan of Treatment: You were found to be positive for a virus which likely caused your upper respiratory infection due to a virus. You were negative for the Flu. You were given supportive breahting treatment, cough suppressants, and Mucinex.    Diagnosis: HLD (hyperlipidemia)  Assessment and Plan of Treatment: Continue your lovastatin 20 mg once per day.    Diagnosis: Afib  Assessment and Plan of Treatment: Continue your Eliquis 2.5 mg twice per day.    Diagnosis: HTN (hypertension)  Assessment and Plan of Treatment: Continue your metoprolol succinate 200 mg once per day, Lasix 40 mg once per day (Monday, Wednesday, Friday, Sunday), Lasix 20 mg once per day (Tuesday, Thursday, Saturday), potassium chloride 10 mEq 4x/week, and diltiazem 120 mg once per day.    Diagnosis: Hypothyroidism  Assessment and Plan of Treatment: Continue your levothyroxine 100 mcg once per day.    Diagnosis: Gastric reflux  Assessment and Plan of Treatment: Continue your omeprazole 40 mg once per day. PRINCIPAL DISCHARGE DIAGNOSIS  Diagnosis: Congestive heart failure, unspecified HF chronicity, unspecified heart failure type  Assessment and Plan of Treatment: You were admitted to the [facility] due to congestion and wheezing. You were admitted due to CHF exacerbation, which was confirmed with chest X-ray and blood work. You likely also had a viral upper respiratory illness.. A cardiologist evaluated you while you were in the hospital. You were given IV Lasix.   A chest CT was performed, which showed a 4.4 cm dilation of the ascending aorta. You should follow this up with a cardiologist.  The CT also showed mild bronchiolitis in the right lung.  A physical therapist evaluated you in the hospital and you did not neeed physical therapy after discharge from the hospital.      SECONDARY DISCHARGE DIAGNOSES  Diagnosis: Upper respiratory infection  Assessment and Plan of Treatment: You were found to be positive for a virus which likely caused your upper respiratory infection due to a virus. You were negative for the Flu. You were given supportive breahting treatment, cough suppressants, and Mucinex.    Diagnosis: Abnormal abdominal CT scan  Assessment and Plan of Treatment: The image showed air in your bile duct, which is likely due to the ERCP you had. Please follow this up with a gastroenterologist.  Furthermore, imaging may have revealed L sided hydronephrosis. You may need to follow this up with additional imaging. You should talk to your primary care doctor.    Diagnosis: HLD (hyperlipidemia)  Assessment and Plan of Treatment: Continue your lovastatin 20 mg once per day.    Diagnosis: Afib  Assessment and Plan of Treatment: Continue your Eliquis 2.5 mg twice per day.    Diagnosis: HTN (hypertension)  Assessment and Plan of Treatment: Continue your metoprolol succinate 200 mg once per day, Lasix 40 mg once per day (Monday, Wednesday, Friday, Sunday), Lasix 20 mg once per day (Tuesday, Thursday, Saturday), potassium chloride 10 mEq 4x/week, and diltiazem 120 mg once per day.    Diagnosis: Hypothyroidism  Assessment and Plan of Treatment: Continue your levothyroxine 100 mcg once per day.    Diagnosis: Gastric reflux  Assessment and Plan of Treatment: Continue your omeprazole 40 mg once per day. PRINCIPAL DISCHARGE DIAGNOSIS  Diagnosis: Congestive heart failure, unspecified HF chronicity, unspecified heart failure type  Assessment and Plan of Treatment: You were admitted to the [facility] due to congestion and wheezing. You were admitted due to CHF exacerbation, which was confirmed with chest X-ray and blood work. You likely also had a viral upper respiratory illness.. A cardiologist evaluated you while you were in the hospital. You were given IV Lasix.   A chest CT was performed, which showed a 4.4 cm dilation of the ascending aorta. You should follow this up with a cardiologist.  The CT also showed mild bronchiolitis in the right lung.  A physical therapist evaluated you in the hospital and you did not neeed physical therapy after discharge from the hospital.      SECONDARY DISCHARGE DIAGNOSES  Diagnosis: Upper respiratory infection  Assessment and Plan of Treatment: You were found to be positive for a virus which likely caused your upper respiratory infection due to a virus. You were negative for the Flu. You were given supportive breahting treatment, cough suppressants, and Mucinex.    Diagnosis: Abnormal abdominal CT scan  Assessment and Plan of Treatment: The image showed air in your bile duct, which is likely due to the ERCP you had in 2018. Please follow this up with your gastroenterologist. 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  Furthermore, imaging may have revealed L sided hydronephrosis. You may need to follow this up with additional imaging. You should talk to your primary care doctor.    Diagnosis: HLD (hyperlipidemia)  Assessment and Plan of Treatment: Continue your lovastatin 20 mg once per day.    Diagnosis: Afib  Assessment and Plan of Treatment: Continue your Eliquis 2.5 mg twice per day.    Diagnosis: HTN (hypertension)  Assessment and Plan of Treatment: Continue your metoprolol succinate 200 mg once per day, Lasix 40 mg once per day (Monday, Wednesday, Friday, Sunday), Lasix 20 mg once per day (Tuesday, Thursday, Saturday), potassium chloride 10 mEq 4x/week, and diltiazem 120 mg once per day.    Diagnosis: Hypothyroidism  Assessment and Plan of Treatment: Continue your levothyroxine 100 mcg once per day.    Diagnosis: Gastric reflux  Assessment and Plan of Treatment: Continue your omeprazole 40 mg once per day. PRINCIPAL DISCHARGE DIAGNOSIS  Diagnosis: Congestive heart failure, unspecified HF chronicity, unspecified heart failure type  Assessment and Plan of Treatment: [facility] due to congestion and wheezing. You were admitted due to CHF exacerbation, which was confirmed with chest X-ray and blood work. You likely also had a viral upper respiratory illness.. A cardiologist evaluated you while you were in the hospital. You were given IV Lasix.   A chest CT was performed, which showed a 4.4 cm dilation of the ascending aorta. You should follow this up with a cardiologist.  The CT also showed mild bronchiolitis in the right lung. I sent a prescription, Duoneb nebulizer to take 3 times per day for 7 days to your pharmacy, [facility].  A physical therapist evaluated you in the hospital and you did not neeed physical therapy after discharge from the hospital.      SECONDARY DISCHARGE DIAGNOSES  Diagnosis: Abnormal abdominal CT scan  Assessment and Plan of Treatment: The image showed air in your bile duct, which is likely due to the ERCP you had in 2018. Please follow this up with your gastroenterologist. 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  Furthermore, imaging may have revealed L sided hydronephrosis. You may need to follow this up with additional imaging. You should talk to your primary care doctor.    Diagnosis: Gastric reflux  Assessment and Plan of Treatment: Continue your omeprazole 40 mg once per day.    Diagnosis: Upper respiratory infection  Assessment and Plan of Treatment: You were found to be positive for a virus which likely caused your upper respiratory infection due to a virus. You were negative for the Flu. You were given supportive breahting treatment, cough suppressants, and Mucinex.    Diagnosis: HTN (hypertension)  Assessment and Plan of Treatment: Continue your metoprolol succinate 200 mg once per day, Lasix 40 mg once per day (Monday, Wednesday, Friday, Sunday), Lasix 20 mg once per day (Tuesday, Thursday, Saturday), potassium chloride 10 mEq 4x/week, and diltiazem 120 mg once per day.    Diagnosis: Afib  Assessment and Plan of Treatment: Continue your Eliquis 2.5 mg twice per day.    Diagnosis: HLD (hyperlipidemia)  Assessment and Plan of Treatment: Continue your lovastatin 20 mg once per day.    Diagnosis: Hypothyroidism  Assessment and Plan of Treatment: Continue your levothyroxine 100 mcg once per day. PRINCIPAL DISCHARGE DIAGNOSIS  Diagnosis: Congestive heart failure, unspecified HF chronicity, unspecified heart failure type  Assessment and Plan of Treatment: You were admitted due to  Diastolic CHF exacerbation,  - A cardiologist evaluated you while you were in the hospital. You were given IV Lasix 40 mg in ER.   A chest CT was performed, which showed a 4.4 cm dilation of the ascending aorta. You should follow this up with a cardiologist.  The CT also showed mild bronchiolitis in the right lung. I sent a prescription, Duoneb nebulizer to take 3 times per day for 7 days to your pharmacy, [facility].  A physical therapist evaluated you in the hospital and you did not neeed physical therapy after discharge from the hospital.      SECONDARY DISCHARGE DIAGNOSES  Diagnosis: Upper respiratory infection  Assessment and Plan of Treatment: You were found to have acute Viral infection , which likely caused your upper respiratory infection/ Viral Bronchitis  - Human Metapneumo Virus , stay at home, rest, eat well, Avoid close contact with Family members.  - You were negative for the Flu.  - You were given supportive breahting treatment, cough suppressants, and Mucinex.  - Continue Duonebs 3x day , Mucinex , Start Perdnisone 20 mg 1 tab daily x 5 days, take with Food  & PPI, start tomorrow.    Diagnosis: Abnormal abdominal CT scan  Assessment and Plan of Treatment: The image showed air in your bile duct, which is likely due to the ERCP you had in 2018. Please follow this up with your gastroenterologist. 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  -Follow up with your GI in 4 weeks  Furthermore, imaging may have revealed L sided hydronephrosis.  likely chronic ,You may need to follow this up with additional imaging, May need Urology as out pt. You should talk to your primary care doctor.    Diagnosis: HTN (hypertension)  Assessment and Plan of Treatment: Continue your metoprolol succinate 200 mg once per day,   -Continue Lasix 20 mg daily until Friday 2/2 Viral infection, after that follow up your Home lasix schedule.  -Lasix 40 mg once per day (Monday, Wednesday, Friday, Sunday), Lasix 20 mg once per day (Tuesday, Thursday, Saturday), potassium chloride 10 mEq 4x/week, and diltiazem 120 mg once per day.    Diagnosis: Gastric reflux  Assessment and Plan of Treatment: Continue your omeprazole 40 mg once per day.    Diagnosis: Afib  Assessment and Plan of Treatment: Continue your Eliquis 2.5 mg twice per day. On Toprol  mg 1 tab daily, Pt has [employer] ICD    Diagnosis: HLD (hyperlipidemia)  Assessment and Plan of Treatment: Continue your lovastatin 20 mg once per day.    Diagnosis: Hypothyroidism  Assessment and Plan of Treatment: Continue your levothyroxine 100 mcg once per day.

## 2020-02-09 NOTE — H&P ADULT - PROBLEM SELECTOR PLAN 6
- patient on Eliquis 2.5mg BID, will continue  - ambulate with assistance  - PT consult - not on any medications for BP  - continue to monitor routine hemodynamics, well controlled - continue with home levothyroxine 100mcg daily

## 2020-02-09 NOTE — H&P ADULT - PROBLEM SELECTOR PLAN 2
Consider viral URI as patient afebrile without leukocytosis  - RSV/flu negative  - Check RVP  - supportive treatment with duonebs, cough suppresants  - Consider viral URI as patient afebrile without leukocytosis, s/p amoxicillin x1 day outpatient  - RSV/flu negative, f/u RVP  - supportive treatment with duonebs, cough suppressants, s/p solumedrol 125mg IVP x1 in the ED, will hold for now  - CXR does not show infiltrate however LLL difficult to visualize, will get CT chest to r/o consolidation  - monitor off abx for now  - f/u blood cultures Consider viral URI as patient afebrile without leukocytosis, s/p amoxicillin x1 day outpatient  - RSV/flu negative, f/u RVP  - supportive treatment with duonebs, cough suppressants, Mucinex s/p solumedrol 125mg IVP x1 in the ED, will hold for now  - CXR does not show infiltrate however LLL difficult to visualize, will get CT chest to r/o consolidation  - monitor off abx for now  - f/u blood cultures Consider viral URI as patient afebrile without leukocytosis,   -s/p amoxicillin x1 day outpatient  - RSV/flu negative, f/u RVP, On Isolation  - supportive treatment with Duoneb, cough suppressants, Mucinex s/p solumedrol 125mg IVP x1 in the ED, will hold for now  - CXR does not show infiltrate however LLL difficult to visualize, will get CT chest -NON Cont to r/o  PNA /consolidation  - monitor off abx for now  - f/u blood cultures x2

## 2020-02-09 NOTE — H&P ADULT - NSICDXPASTMEDICALHX_GEN_ALL_CORE_FT
PAST MEDICAL HISTORY:  Afib     CHF (congestive heart failure)     HLD (hyperlipidemia)     HTN (hypertension) PAST MEDICAL HISTORY:  Afib     CHF (congestive heart failure)     HLD (hyperlipidemia)     HTN (hypertension)     Hypothyroidism PAST MEDICAL HISTORY:  Afib     CHF (congestive heart failure) Diastolic CHF    HLD (hyperlipidemia)     HTN (hypertension)     Hypothyroidism PAST MEDICAL HISTORY:  Afib     CHF (congestive heart failure) Diastolic CHF    Duodenal diverticulum     HLD (hyperlipidemia)     HTN (hypertension)     Hypothyroidism

## 2020-02-09 NOTE — H&P ADULT - PROBLEM SELECTOR PLAN 1
Admit to telemetry  - BNP in ER 1454  - I&O's  - daily weights  - CXR prelim showing vascular congestion. F/u official CXR  - s/p Lasix 40mg IV in ER  - Cardiology following- Dr. Barnes Admit to telemetry  - CXR prelim showing vascular congestion. F/u official CXR.  Likely exacerbation in setting of acute viral illness vs ?PNA  - s/p Lasix 40mg IV in ER, will continue with Lasix 40mg IVP daily for now  - BNP in ER 1454  - strict I&O's  - daily weights  - Cardiology following- Dr. Barnes Admit to telemetry Ac on Chronic Diastolic CHF  - CXR prelim showing vascular congestion. F/u official CXR.  Likely exacerbation in setting of acute viral illness vs  R/O PNA  - s/p Lasix 40mg IV in ER, will continue with Lasix 40mg IVP daily for now  - BNP in ER 1454  - strict I&O's  - daily weights  - Cardiology following- Dr. Estrella

## 2020-02-09 NOTE — H&P ADULT - GASTROINTESTINAL DETAILS
soft/no distention/normal/bowel sounds normal/nontender soft/bowel sounds normal/no rigidity/no distention/no bruit/no organomegaly/no masses palpable/normal/no guarding/nontender

## 2020-02-09 NOTE — DISCHARGE NOTE PROVIDER - HOSPITAL COURSE
History of Present Illness    89 year old female with past medical history of Afib on Eliquis, CHF, s/p PPM, HTN, HLD, hypothyroidism presented with cough, congestion and wheezing x 4 days. Patient stated that since Wednesday she had been having a runny nose and productive cough that had been progressively worsening. Patient had allergic rhinitis, however stated that this was different as her nose was leaking green colored discharge. She then went to her PCP and was prescribed amoxicillin and tessalon perles which she has been taking for 1 day. However, the morning of admission, the daughter at bedside stated she was more weak and wheezing therefore they decided to bring her in.  Patient had cough at baseline however this was worse and productive of sputum and stated that she felt as if something was stuck in her throat. Denied recent travel, postnasal drip, chest pain, SOB (other than baseline), abd pain, diarrhea, constipation, nausea, vomiting, muscle aches or pains or sick contacts. Patient admitted to some chest discomfort a few days ago when she was getting up from chair but felt that she was straining herself and it resolved.  Denied chest pain or SOB with ambulation. Patient was able to lay flat. She was on Lasix at home, however did not take her dose day of admission.  Family stated that she liked Chinese food and junk food, however for the past month, patient had been very diligent about what she was eating and maintained a low salt diet.  Patient also stated that about 3 weeks prior to admission, her legs "buckled" and she fell on her b/l knees, was able to get up and walk around, since then has been getting PT and now has [facility] set up.          Hospital Course    Patient was admitted for CHF exacerbation and viral infection with failed outpatient antibiotics as well as to rule out pneumonia. In ED, patient's vitals were as follows, T 97.5F, HR 67, /71, RR 22 and SpO2 93% RA. Cbc, cmp wnl. BNP 1454. Flu/RSV negative. Trop .016. Patient received: Solumedrol 125mg IV x1, Lasix 40mg IVx1. Blood cx collected. CXR showed blunting of L costophrenic angle unchanged from before, RLL haziness present unchanged from prior. EKG showed NSR with old ST abnormalities and TWI and regular PVCs. Patient likely had viral URI as patient was afebrile without leukocytosis. Supportive treatment with Duonebs, cough suppressants, Mucinex were provided. Cardiology evaluated the patient and recommended Lasix 40 mg IV x1. Chest CT showed _______________.                 Consultants    Cardiology: Dr. Estrella            Vital Signs                Physical Exam History of Present Illness    89 year old female with past medical history of Afib on Eliquis, CHF, s/p PPM, HTN, HLD, hypothyroidism presented with cough, congestion and wheezing x 4 days. Patient stated that since Wednesday she had been having a runny nose and productive cough that had been progressively worsening. Patient had allergic rhinitis, however stated that this was different as her nose was leaking green colored discharge. She then went to her PCP and was prescribed amoxicillin and tessalon perles which she has been taking for 1 day. However, the morning of admission, the daughter at bedside stated she was more weak and wheezing therefore they decided to bring her in.  Patient had cough at baseline however this was worse and productive of sputum and stated that she felt as if something was stuck in her throat. Denied recent travel, postnasal drip, chest pain, SOB (other than baseline), abd pain, diarrhea, constipation, nausea, vomiting, muscle aches or pains or sick contacts. Patient admitted to some chest discomfort a few days ago when she was getting up from chair but felt that she was straining herself and it resolved.  Denied chest pain or SOB with ambulation. Patient was able to lay flat. She was on Lasix at home, however did not take her dose day of admission.  Family stated that she liked Chinese food and junk food, however for the past month, patient had been very diligent about what she was eating and maintained a low salt diet.  Patient also stated that about 3 weeks prior to admission, her legs "buckled" and she fell on her b/l knees, was able to get up and walk around, since then has been getting PT and now has [facility] set up.          Hospital Course    Patient was admitted for CHF exacerbation and viral infection with failed outpatient antibiotics as well as to rule out pneumonia. In ED, patient's vitals were as follows, T 97.5F, HR 67, /71, RR 22 and SpO2 93% RA. Cbc, cmp wnl. BNP 1454. Flu/RSV negative. Trop .016. Patient received: Solumedrol 125mg IV x1, Lasix 40mg IVx1. Blood cx collected. CXR showed blunting of L costophrenic angle unchanged from before, RLL haziness present unchanged from prior. EKG showed NSR with old ST abnormalities and TWI and regular PVCs. Patient likely had viral URI as patient was afebrile without leukocytosis. Supportive treatment with Duonebs, cough suppressants, Mucinex were provided. The patient was positive for hMPV. Prednisone 20 mg PO for 5 days was given. Cardiology evaluated the patient and recommended Lasix 40 mg IV x1. Chest CT showed 4.4 cm fusiform aneurysmal dilatation of the ascending aorta (no rupture); mild multifocal bronchiolitis in the right lung; pneumobilia (correlate for history of ERCP with sphincterotomy or similar biliary intervention. In the absence of such history the presence of gas in the bile ducts may signify cholangitis or juanita-enteric fistula; there is a fluid attenuation lesion at/near the expected position of the common bile duct which may be dilated common bile ducts/choledochal cyst or could be a duodenal diverticulum.            Consultants    Cardiology: Dr. Estrella            Vital Signs                Physical Exam History of Present Illness    89 year old female with past medical history of Afib on Eliquis, CHF, s/p PPM, HTN, HLD, hypothyroidism presented with cough, congestion and wheezing x 4 days. Patient stated that since Wednesday she had been having a runny nose and productive cough that had been progressively worsening. Patient had allergic rhinitis, however stated that this was different as her nose was leaking green colored discharge. She then went to her PCP and was prescribed amoxicillin and tessalon perles which she has been taking for 1 day. However, the morning of admission, the daughter at bedside stated she was more weak and wheezing therefore they decided to bring her in.  Patient had cough at baseline however this was worse and productive of sputum and stated that she felt as if something was stuck in her throat. Denied recent travel, postnasal drip, chest pain, SOB (other than baseline), abd pain, diarrhea, constipation, nausea, vomiting, muscle aches or pains or sick contacts. Patient admitted to some chest discomfort a few days ago when she was getting up from chair but felt that she was straining herself and it resolved.  Denied chest pain or SOB with ambulation. Patient was able to lay flat. She was on Lasix at home, however did not take her dose day of admission.  Family stated that she liked Chinese food and junk food, however for the past month, patient had been very diligent about what she was eating and maintained a low salt diet.  Patient also stated that about 3 weeks prior to admission, her legs "buckled" and she fell on her b/l knees, was able to get up and walk around, since then has been getting PT and now has [facility] set up.          Hospital Course    Patient was admitted for CHF exacerbation and viral infection with failed outpatient antibiotics as well as to rule out pneumonia. In ED, patient's vitals were as follows, T 97.5F, HR 67, /71, RR 22 and SpO2 93% RA. Cbc, cmp wnl. BNP 1454. Flu/RSV negative. Trop .016. Patient received: Solumedrol 125mg IV x1, Lasix 40mg IVx1. Blood cx collected. CXR showed blunting of L costophrenic angle unchanged from before, RLL haziness present unchanged from prior. EKG showed NSR with old ST abnormalities and TWI and regular PVCs. Patient likely had viral URI as patient was afebrile without leukocytosis. Supportive treatment with Duonebs, cough suppressants, Mucinex were provided. The patient was positive for hMPV. Prednisone 20 mg PO for 5 days was given. Cardiology evaluated the patient and recommended Lasix 40 mg IV x1. Chest CT showed 4.4 cm fusiform aneurysmal dilatation of the ascending aorta (no rupture); mild multifocal bronchiolitis in the right lung; pneumobilia.  (correlate for history of ERCP with sphincterotomy or similar biliary intervention. In the absence of such history the presence of gas in the bile ducts may signify cholangitis or juanita-enteric fistula; there is a fluid attenuation lesion at/near the expected position of the common bile duct which may be dilated common bile ducts/choledochal cyst or could be a duodenal diverticulum. Physical therapy evaluated patient and recommended to discharge to home - no skilled PT needs.        Consultants    Cardiology: Dr. Estrella            Vital Signs                Physical Exam History of Present Illness    89 year old female with past medical history of Afib on Eliquis, CHF, s/p PPM, HTN, HLD, hypothyroidism presented with cough, congestion and wheezing x 4 days. Patient stated that since Wednesday she had been having a runny nose and productive cough that had been progressively worsening. Patient had allergic rhinitis, however stated that this was different as her nose was leaking green colored discharge. She then went to her PCP and was prescribed amoxicillin and tessalon perles which she has been taking for 1 day. However, the morning of admission, the daughter at bedside stated she was more weak and wheezing therefore they decided to bring her in.  Patient had cough at baseline however this was worse and productive of sputum and stated that she felt as if something was stuck in her throat. Denied recent travel, postnasal drip, chest pain, SOB (other than baseline), abd pain, diarrhea, constipation, nausea, vomiting, muscle aches or pains or sick contacts. Patient admitted to some chest discomfort a few days ago when she was getting up from chair but felt that she was straining herself and it resolved.  Denied chest pain or SOB with ambulation. Patient was able to lay flat. She was on Lasix at home, however did not take her dose day of admission.  Family stated that she liked Chinese food and junk food, however for the past month, patient had been very diligent about what she was eating and maintained a low salt diet.  Patient also stated that about 3 weeks prior to admission, her legs "buckled" and she fell on her b/l knees, was able to get up and walk around, since then has been getting PT and now has [facility] set up.          Hospital Course    Patient was admitted for CHF exacerbation and viral infection with failed outpatient antibiotics as well as to rule out pneumonia. In ED, patient's vitals were as follows, T 97.5F, HR 67, /71, RR 22 and SpO2 93% RA. Cbc, cmp wnl. BNP 1454. Flu/RSV negative. Trop .016. Patient received: Solumedrol 125mg IV x1, Lasix 40mg IVx1. Blood cx collected. CXR showed blunting of L costophrenic angle unchanged from before, RLL haziness present unchanged from prior. EKG showed NSR with old ST abnormalities and TWI and regular PVCs. Patient likely had viral URI as patient was afebrile without leukocytosis. Supportive treatment with Duonebs, cough suppressants, Mucinex were provided. The patient was positive for hMPV. Prednisone 20 mg PO for 5 days was given. Cardiology evaluated the patient and recommended Lasix 40 mg IV x1. Chest CT showed 4.4 cm fusiform aneurysmal dilatation of the ascending aorta (no rupture); mild multifocal bronchiolitis in the right lung. Imaging showed pneumobilia and duodenal diverticulum. This was discussed with patient's outpatient gastroenterologist, Dr. Garcia. As per Dr. Garcia, patient had ERCP with sphincterotomy for multiple stones. 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 Physical therapy evaluated patient and recommended to discharge to home (no skilled PT needs.)        Consultants    Cardiology: Dr. Estrella            Vital Signs                Physical Exam History of Present Illness    89 year old female with past medical history of Afib on Eliquis, CHF, s/p PPM, HTN, HLD, hypothyroidism, ERCP with sphincterotomy for multiple stones (2018), duodenal diverticulum, presented with cough, congestion and wheezing x 4 days. Patient stated that since Wednesday she had been having a runny nose and productive cough that had been progressively worsening. Patient had allergic rhinitis, however stated that this was different as her nose was leaking green colored discharge. She then went to her PCP and was prescribed amoxicillin and tessalon perles which she has been taking for 1 day. However, the morning of admission, the daughter at bedside stated she was more weak and wheezing therefore they decided to bring her in.  Patient had cough at baseline however this was worse and productive of sputum and stated that she felt as if something was stuck in her throat. Denied recent travel, postnasal drip, chest pain, SOB (other than baseline), abd pain, diarrhea, constipation, nausea, vomiting, muscle aches or pains or sick contacts. Patient admitted to some chest discomfort a few days ago when she was getting up from chair but felt that she was straining herself and it resolved.  Denied chest pain or SOB with ambulation. Patient was able to lay flat. She was on Lasix at home, however did not take her dose day of admission.  Family stated that she liked Chinese food and junk food, however for the past month, patient had been very diligent about what she was eating and maintained a low salt diet.  Patient also stated that about 3 weeks prior to admission, her legs "buckled" and she fell on her b/l knees, was able to get up and walk around, since then has been getting PT and now has [facility] set up.          Hospital Course    Patient was admitted for CHF exacerbation and viral infection with failed outpatient antibiotics as well as to rule out pneumonia. In ED, patient's vitals were as follows, T 97.5F, HR 67, /71, RR 22 and SpO2 93% RA. Cbc, cmp wnl. BNP 1454. Flu/RSV negative. Trop .016. Patient received: Solumedrol 125mg IV x1, Lasix 40mg IVx1. Blood cx collected. CXR showed blunting of L costophrenic angle unchanged from before, RLL haziness present unchanged from prior. EKG showed NSR with old ST abnormalities and TWI and regular PVCs. Patient likely had viral URI as patient was afebrile without leukocytosis. Supportive treatment with Duonebs, cough suppressants, Mucinex were provided. The patient was positive for hMPV. Prednisone 20 mg PO for 5 days was given. Cardiology evaluated the patient and recommended Lasix 40 mg IV x1. Chest CT showed 4.4 cm fusiform aneurysmal dilatation of the ascending aorta (no rupture); mild multifocal bronchiolitis in the right lung. Imaging showed pneumobilia and duodenal diverticulum. This was discussed with patient's outpatient gastroenterologist, Dr. Garcia. As per Dr. Garcia, patient had ERCP with sphincterotomy for multiple stones. 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 Physical therapy evaluated patient and recommended to discharge to home (no skilled PT needs.)        Consultants    Cardiology: Dr. Estrella            Vital Signs                Physical Exam History of Present Illness    89 year old female with past medical history of Afib on Eliquis, CHF, s/p PPM, HTN, HLD, hypothyroidism, ERCP with sphincterotomy for multiple stones (2018), duodenal diverticulum, presented with cough, congestion and wheezing x 4 days. Patient stated that since Wednesday she had been having a runny nose and productive cough that had been progressively worsening. Patient had allergic rhinitis, however stated that this was different as her nose was leaking green colored discharge. She then went to her PCP and was prescribed amoxicillin and tessalon perles which she has been taking for 1 day. However, the morning of admission, the daughter at bedside stated she was more weak and wheezing therefore they decided to bring her in.  Patient had cough at baseline however this was worse and productive of sputum and stated that she felt as if something was stuck in her throat. Denied recent travel, postnasal drip, chest pain, SOB (other than baseline), abd pain, diarrhea, constipation, nausea, vomiting, muscle aches or pains or sick contacts. Patient admitted to some chest discomfort a few days ago when she was getting up from chair but felt that she was straining herself and it resolved.  Denied chest pain or SOB with ambulation. Patient was able to lay flat. She was on Lasix at home, however did not take her dose day of admission.  Family stated that she liked Chinese food and junk food, however for the past month, patient had been very diligent about what she was eating and maintained a low salt diet.  Patient also stated that about 3 weeks prior to admission, her legs "buckled" and she fell on her b/l knees, was able to get up and walk around, since then has been getting PT and now has [facility] set up.          Hospital Course    Patient was admitted for CHF exacerbation and viral infection with failed outpatient antibiotics as well as to rule out pneumonia. In ED, patient's vitals were as follows, T 97.5F, HR 67, /71, RR 22 and SpO2 93% RA. Cbc, cmp wnl. BNP 1454. Flu/RSV negative. Trop .016. Patient received: Solumedrol 125mg IV x1, Lasix 40mg IVx1. Blood cx collected. CXR showed blunting of L costophrenic angle unchanged from before, RLL haziness present unchanged from prior. EKG showed NSR with old ST abnormalities and TWI and regular PVCs. Patient likely had viral URI as patient was afebrile without leukocytosis. Supportive treatment with Duonebs, cough suppressants, Mucinex were provided. The patient was positive for hMPV. Prednisone 20 mg PO for 5 days was given. Cardiology evaluated the patient and recommended Lasix 40 mg IV x1. Chest CT showed 4.4 cm fusiform aneurysmal dilatation of the ascending aorta (no rupture); mild multifocal bronchiolitis in the right lung. Imaging showed pneumobilia and duodenal diverticulum. This was discussed with patient's outpatient gastroenterologist, Dr. Garcia. As per Dr. Garcia, patient had ERCP with sphincterotomy for multiple stones. 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 Physical therapy evaluated patient and recommended to discharge to home (no skilled PT needs.)        Consultants    Cardiology: Dr. Estrella            Vital Signs Last 24 Hrs    T(C): 36.9 (10 Feb 2020 08:01), Max: 37.1 (10 Feb 2020 05:30)    T(F): 98.5 (10 Feb 2020 08:01), Max: 98.7 (10 Feb 2020 05:30)    HR: 60 (10 Feb 2020 13:43) (60 - 79)    BP: 109/70 (10 Feb 2020 13:11) (109/70 - 141/71)    BP(mean): --    RR: 18 (10 Feb 2020 13:11) (16 - 24)    SpO2: 95% (10 Feb 2020 13:43) (92% - 98%)            Physical Exam    HYSICAL EXAM:    GENERAL:  [ X ] NAD , [ X ] well appearing, [  ] Agitated, [  ] Drowsy,  [  ] Lethargy, [  ] confused     HEAD:  [X  ] Normal, [  ] Other    EYES:  [ X ] EOMI, [X  ] PERRLA, [ X ] conjunctiva and sclera clear normal, [  ] Other,  [  ] Pallor,[  ] Discharge    ENMT:  [ X ] Normal, [ X ] Moist mucous membranes, [  ] Good dentition, [  ] No Thrush    NECK:  [ X ] Supple, [  ] No JVD, [  ] Normal thyroid, [  ] Lymphadenopathy [  ] Other    CHEST/LUNG: [X ] Rhonchi at B/L bases [  ] Clear to auscultation bilaterally, [  ] Breath Sounds equal B/L / Decrease, [  ] poor effort  [  ] No rales, [  ] No rhonchi  [  ]  No wheezing,     HEART:  [ X ] Regular rate and rhythm, [  ] tachycardia, [  ] Bradycardia,  [  ] irregular  [  ] No murmurs, No rubs, No gallops, [  ] PPM in place (Mfr:  )    ABDOMEN:  [ X ] Soft, [ X ] Nontender, [X  ] Nondistended, [  ] No mass, [  ] Bowel sounds present, [  ] obese    NERVOUS SYSTEM:  [X  ] Alert & Oriented X3, [  ] Nonfocal  [  ] Confusion  [  ] Encephalopathic [  ] Sedated [  ] Unable to assess, [  ] Dementia [  ] Other-    EXTREMITIES: [X  ] 2+ Peripheral Pulses, No clubbing, No cyanosis,  [  ] edema B/L lower EXT. [  ] PVD stasis skin changes B/L Lower EXT, [  ] wound    LYMPH: No lymphadenopathy noted    SKIN:  [ X ] No rashes or lesions, [  ] Pressure Ulcers, [  ] ecchymosis, [  ] Skin Tears, [  ] Other History of Present Illness    89 year old female with past medical history of Afib on Eliquis, CHF, s/p PPM, HTN, HLD, hypothyroidism, ERCP with sphincterotomy for multiple stones (2018), duodenal diverticulum, presented with cough, congestion and wheezing x 4 days. Patient stated that since Wednesday she had been having a runny nose and productive cough that had been progressively worsening. Patient had allergic rhinitis, however stated that this was different as her nose was leaking green colored discharge. She then went to her PCP and was prescribed amoxicillin and tessalon perles which she has been taking for 1 day. However, the morning of admission, the daughter at bedside stated she was more weak and wheezing therefore they decided to bring her in.  Patient had cough at baseline however this was worse and productive of sputum and stated that she felt as if something was stuck in her throat. Denied recent travel, postnasal drip, chest pain, SOB (other than baseline), abd pain, diarrhea, constipation, nausea, vomiting, muscle aches or pains or sick contacts. Patient admitted to some chest discomfort a few days ago when she was getting up from chair but felt that she was straining herself and it resolved.  Denied chest pain or SOB with ambulation. Patient was able to lay flat. She was on Lasix at home, however did not take her dose day of admission.  Family stated that she liked Chinese food and junk food, however for the past month, patient had been very diligent about what she was eating and maintained a low salt diet.  Patient also stated that about 3 weeks prior to admission, her legs "buckled" and she fell on her b/l knees, was able to get up and walk around, since then has been getting PT and now has [facility] set up.          Hospital Course    Patient was admitted for CHF exacerbation and viral infection with failed outpatient antibiotics as well as to rule out pneumonia. In ED, patient's vitals were as follows, T 97.5F, HR 67, /71, RR 22 and SpO2 93% RA. Cbc, cmp wnl. BNP 1454. Flu/RSV negative. Trop .016. Patient received: Solumedrol 125mg IV x1, Lasix 40mg IVx1. Blood cx collected. CXR showed blunting of L costophrenic angle unchanged from before, RLL haziness present unchanged from prior. EKG showed NSR with old ST abnormalities and TWI and regular PVCs. Patient likely had viral URI as patient was afebrile without leukocytosis. Supportive treatment with Duonebs, cough suppressants, Mucinex were provided. The patient was positive for hMPV. Prednisone 20 mg PO for 5 days was given. Cardiology evaluated the patient and recommended Lasix 40 mg IV x1. Chest CT showed 4.4 cm fusiform aneurysmal dilatation of the ascending aorta (no rupture); mild multifocal bronchiolitis in the right lung. Imaging showed pneumobilia and duodenal diverticulum. This was discussed with patient's outpatient gastroenterologist, Dr. Garcia. As per Dr. Garcia, patient had ERCP with sphincterotomy for multiple stones. 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 Physical therapy evaluated patient and recommended to discharge to home (no skilled PT needs.)        Consultants    Cardiology: Dr. Estrella

## 2020-02-09 NOTE — H&P ADULT - RS GEN PE MLT RESP DETAILS PC
airway patent/no chest wall tenderness/rhonchi/wheezes rhonchi/airway patent/no chest wall tenderness/rales/wheezes

## 2020-02-09 NOTE — H&P ADULT - NEUROLOGICAL DETAILS
alert and oriented x 3 cranial nerves intact/responds to verbal commands/sensation intact/normal strength/alert and oriented x 3

## 2020-02-09 NOTE — DISCHARGE NOTE PROVIDER - PROVIDER TOKENS
FREE:[LAST:[Aurelia],FIRST:[Carri],PHONE:[phone]],FAX:[(   )    -],FOLLOWUP:[2 weeks]] FREE:[LAST:[Aurelia],FIRST:[Carri],PHONE:[phone]],FAX:[(   )    -],FOLLOWUP:[2 weeks]],PROVIDER:[TOKEN:[[id number]],FOLLOWUP:[2 weeks]] PROVIDER:[TOKEN:[[id number]],FOLLOWUP:[2 weeks]],FREE:[LAST:[Aurelia],FIRST:[Carri],PHONE:[phone]],FAX:[(   )    -],FOLLOWUP:[2 weeks]],PROVIDER:[TOKEN:[[id number]]]

## 2020-02-09 NOTE — ED ADULT NURSE NOTE - NSIMPLEMENTINTERV_GEN_ALL_ED
Implemented All Fall Risk Interventions:  [place] to call system. Call bell, personal items and telephone within reach. Instruct patient to call for assistance. Room bathroom lighting operational. Non-slip footwear when patient is off stretcher. Physically safe environment: no spills, clutter or unnecessary equipment. Stretcher in lowest position, wheels locked, appropriate side rails in place. Provide visual cue, wrist band, yellow gown, etc. Monitor gait and stability. Monitor for mental status changes and reorient to person, place, and time. Review medications for side effects contributing to fall risk. Reinforce activity limits and safety measures with patient and family.

## 2020-02-09 NOTE — H&P ADULT - MUSCULOSKELETAL
details… detailed exam no tenderness to palpation of b/l patella, able to extend leg/no joint swelling/no joint erythema/normal strength/normal

## 2020-02-09 NOTE — ED ADULT TRIAGE NOTE - CHIEF COMPLAINT QUOTE
Cough x 3 days.  Started on amoxicillin by PMD 2 days ago, today symptoms are worse w/ wheeze.
facility-administered medications on file prior to encounter. Allergies:  Aleve [naproxen sodium]; Codeine; and Adhesive tape  Social History     Social History    Marital status:      Spouse name: N/A    Number of children: N/A    Years of education: N/A     Occupational History    Not on file. Social History Main Topics    Smoking status: Former Smoker     Types: Cigarettes     Quit date: 1/1/2003    Smokeless tobacco: Never Used    Alcohol use Yes      Comment: socially    Drug use: No    Sexual activity: Not Currently     Other Topics Concern    Not on file     Social History Narrative    No narrative on file     Family History   Problem Relation Age of Onset    Heart Surgery Mother     Stroke Maternal Grandmother     Stroke Maternal Grandfather     Stroke Paternal Grandmother     Early Death Paternal Grandfather          PHYSICAL EXAM:      There were no vitals taken for this visit. I        Heart:normal    Lungs: normal    Abdomen: normal      ASA Grade:  See anesthesia note      ASSESSMENT AND PLAN:    1. Procedure options, risks and benefits reviewed with patient and expresses understanding.

## 2020-02-09 NOTE — H&P ADULT - NEGATIVE ENMT SYMPTOMS
no ear pain/no vertigo/no throat pain/no sinus symptoms/no post-nasal discharge/no hearing difficulty

## 2020-02-09 NOTE — H&P ADULT - NSHPSOCIALHISTORY_GEN_ALL_CORE
Lives with:   Ambulates with:   Tobacco:   Etoh: Lives alone however house is attached to daughter Ladi's home.  Has home aid for 6 hours a day  Ambulates with walker  Tobacco: quit many years ago (very distant)  Etoh: denies  Illicit Drug: denies

## 2020-02-09 NOTE — INPATIENT CERTIFICATION FOR MEDICARE PATIENTS - RISKS OF ADVERSE EVENTS
Concern for delay in diagnosis and treatment/Concern for renal deterioration Concern for cardiopulmonary deterioration/Concern for delay in diagnosis and treatment

## 2020-02-10 ENCOUNTER — TRANSCRIPTION ENCOUNTER (OUTPATIENT)
Age: 85
End: 2020-02-10

## 2020-02-10 VITALS
RESPIRATION RATE: 18 BRPM | TEMPERATURE: 98 F | OXYGEN SATURATION: 96 % | DIASTOLIC BLOOD PRESSURE: 66 MMHG | SYSTOLIC BLOOD PRESSURE: 132 MMHG | HEART RATE: 68 BPM

## 2020-02-10 DIAGNOSIS — Z98.890 OTHER SPECIFIED POSTPROCEDURAL STATES: Chronic | ICD-10-CM

## 2020-02-10 DIAGNOSIS — R93.5 ABNORMAL FINDINGS ON DIAGNOSTIC IMAGING OF OTHER ABDOMINAL REGIONS, INCLUDING RETROPERITONEUM: ICD-10-CM

## 2020-02-10 LAB
ANION GAP SERPL CALC-SCNC: 9 MMOL/L — SIGNIFICANT CHANGE UP (ref 5–17)
BASOPHILS # BLD AUTO: 0.01 K/UL — SIGNIFICANT CHANGE UP (ref 0–0.2)
BASOPHILS NFR BLD AUTO: 0.2 % — SIGNIFICANT CHANGE UP (ref 0–2)
BUN SERPL-MCNC: 22 MG/DL — SIGNIFICANT CHANGE UP (ref 7–23)
CALCIUM SERPL-MCNC: 9.4 MG/DL — SIGNIFICANT CHANGE UP (ref 8.5–10.1)
CHLORIDE SERPL-SCNC: 101 MMOL/L — SIGNIFICANT CHANGE UP (ref 96–108)
CO2 SERPL-SCNC: 30 MMOL/L — SIGNIFICANT CHANGE UP (ref 22–31)
CREAT SERPL-MCNC: 1.3 MG/DL — SIGNIFICANT CHANGE UP (ref 0.5–1.3)
EOSINOPHIL # BLD AUTO: 0.01 K/UL — SIGNIFICANT CHANGE UP (ref 0–0.5)
EOSINOPHIL NFR BLD AUTO: 0.2 % — SIGNIFICANT CHANGE UP (ref 0–6)
GLUCOSE SERPL-MCNC: 160 MG/DL — HIGH (ref 70–99)
HCT VFR BLD CALC: 45.1 % — HIGH (ref 34.5–45)
HGB BLD-MCNC: 14.2 G/DL — SIGNIFICANT CHANGE UP (ref 11.5–15.5)
HMPV RNA SPEC QL NAA+PROBE: DETECTED
IMM GRANULOCYTES NFR BLD AUTO: 0.2 % — SIGNIFICANT CHANGE UP (ref 0–1.5)
LYMPHOCYTES # BLD AUTO: 0.43 K/UL — LOW (ref 1–3.3)
LYMPHOCYTES # BLD AUTO: 10.3 % — LOW (ref 13–44)
MCHC RBC-ENTMCNC: 29.5 PG — SIGNIFICANT CHANGE UP (ref 27–34)
MCHC RBC-ENTMCNC: 31.5 GM/DL — LOW (ref 32–36)
MCV RBC AUTO: 93.8 FL — SIGNIFICANT CHANGE UP (ref 80–100)
MONOCYTES # BLD AUTO: 0.07 K/UL — SIGNIFICANT CHANGE UP (ref 0–0.9)
MONOCYTES NFR BLD AUTO: 1.7 % — LOW (ref 2–14)
NEUTROPHILS # BLD AUTO: 3.65 K/UL — SIGNIFICANT CHANGE UP (ref 1.8–7.4)
NEUTROPHILS NFR BLD AUTO: 87.4 % — HIGH (ref 43–77)
NRBC # BLD: 0 /100 WBCS — SIGNIFICANT CHANGE UP (ref 0–0)
PLATELET # BLD AUTO: 202 K/UL — SIGNIFICANT CHANGE UP (ref 150–400)
POTASSIUM SERPL-MCNC: 3.6 MMOL/L — SIGNIFICANT CHANGE UP (ref 3.5–5.3)
POTASSIUM SERPL-SCNC: 3.6 MMOL/L — SIGNIFICANT CHANGE UP (ref 3.5–5.3)
RAPID RVP RESULT: DETECTED
RBC # BLD: 4.81 M/UL — SIGNIFICANT CHANGE UP (ref 3.8–5.2)
RBC # FLD: 14.7 % — HIGH (ref 10.3–14.5)
SODIUM SERPL-SCNC: 140 MMOL/L — SIGNIFICANT CHANGE UP (ref 135–145)
WBC # BLD: 4.18 K/UL — SIGNIFICANT CHANGE UP (ref 3.8–10.5)
WBC # FLD AUTO: 4.18 K/UL — SIGNIFICANT CHANGE UP (ref 3.8–10.5)

## 2020-02-10 PROCEDURE — 87798 DETECT AGENT NOS DNA AMP: CPT

## 2020-02-10 PROCEDURE — 87633 RESP VIRUS 12-25 TARGETS: CPT

## 2020-02-10 PROCEDURE — 87040 BLOOD CULTURE FOR BACTERIA: CPT

## 2020-02-10 PROCEDURE — 87581 M.PNEUMON DNA AMP PROBE: CPT

## 2020-02-10 PROCEDURE — 97161 PT EVAL LOW COMPLEX 20 MIN: CPT

## 2020-02-10 PROCEDURE — 87486 CHLMYD PNEUM DNA AMP PROBE: CPT

## 2020-02-10 PROCEDURE — 96374 THER/PROPH/DIAG INJ IV PUSH: CPT

## 2020-02-10 PROCEDURE — 71250 CT THORAX DX C-: CPT

## 2020-02-10 PROCEDURE — 87631 RESP VIRUS 3-5 TARGETS: CPT

## 2020-02-10 PROCEDURE — 36415 COLL VENOUS BLD VENIPUNCTURE: CPT

## 2020-02-10 PROCEDURE — 84484 ASSAY OF TROPONIN QUANT: CPT

## 2020-02-10 PROCEDURE — 80048 BASIC METABOLIC PNL TOTAL CA: CPT

## 2020-02-10 PROCEDURE — 80053 COMPREHEN METABOLIC PANEL: CPT

## 2020-02-10 PROCEDURE — 94760 N-INVAS EAR/PLS OXIMETRY 1: CPT

## 2020-02-10 PROCEDURE — 85730 THROMBOPLASTIN TIME PARTIAL: CPT

## 2020-02-10 PROCEDURE — 83880 ASSAY OF NATRIURETIC PEPTIDE: CPT

## 2020-02-10 PROCEDURE — 85610 PROTHROMBIN TIME: CPT

## 2020-02-10 PROCEDURE — 93005 ELECTROCARDIOGRAM TRACING: CPT

## 2020-02-10 PROCEDURE — 71250 CT THORAX DX C-: CPT | Mod: 26

## 2020-02-10 PROCEDURE — 82550 ASSAY OF CK (CPK): CPT

## 2020-02-10 PROCEDURE — 99285 EMERGENCY DEPT VISIT HI MDM: CPT

## 2020-02-10 PROCEDURE — 94640 AIRWAY INHALATION TREATMENT: CPT

## 2020-02-10 PROCEDURE — 85027 COMPLETE CBC AUTOMATED: CPT

## 2020-02-10 PROCEDURE — 71045 X-RAY EXAM CHEST 1 VIEW: CPT

## 2020-02-10 PROCEDURE — 83605 ASSAY OF LACTIC ACID: CPT

## 2020-02-10 RX ORDER — IPRATROPIUM/ALBUTEROL SULFATE 18-103MCG
3 AEROSOL WITH ADAPTER (GRAM) INHALATION
Qty: 0 | Refills: 0 | DISCHARGE
Start: 2020-02-10

## 2020-02-10 RX ORDER — FUROSEMIDE 40 MG
20 TABLET ORAL DAILY
Refills: 0 | Status: DISCONTINUED | OUTPATIENT
Start: 2020-02-11 | End: 2020-02-10

## 2020-02-10 RX ORDER — IPRATROPIUM/ALBUTEROL SULFATE 18-103MCG
3 AEROSOL WITH ADAPTER (GRAM) INHALATION
Qty: 21 | Refills: 0
Start: 2020-02-10 | End: 2020-02-16

## 2020-02-10 RX ORDER — ATORVASTATIN CALCIUM 80 MG/1
1 TABLET, FILM COATED ORAL
Qty: 0 | Refills: 0 | DISCHARGE
Start: 2020-02-10

## 2020-02-10 RX ORDER — POTASSIUM CHLORIDE 20 MEQ
40 PACKET (EA) ORAL ONCE
Refills: 0 | Status: COMPLETED | OUTPATIENT
Start: 2020-02-10 | End: 2020-02-10

## 2020-02-10 RX ADMIN — Medication 1 MILLIGRAM(S): at 13:07

## 2020-02-10 RX ADMIN — Medication 3 MILLILITER(S): at 07:47

## 2020-02-10 RX ADMIN — APIXABAN 2.5 MILLIGRAM(S): 2.5 TABLET, FILM COATED ORAL at 05:19

## 2020-02-10 RX ADMIN — Medication 100 MILLIGRAM(S): at 05:19

## 2020-02-10 RX ADMIN — Medication 40 MILLIGRAM(S): at 05:20

## 2020-02-10 RX ADMIN — Medication 20 MILLIGRAM(S): at 09:24

## 2020-02-10 RX ADMIN — Medication 100 MILLIGRAM(S): at 13:06

## 2020-02-10 RX ADMIN — PANTOPRAZOLE SODIUM 40 MILLIGRAM(S): 20 TABLET, DELAYED RELEASE ORAL at 05:20

## 2020-02-10 RX ADMIN — Medication 120 MILLIGRAM(S): at 05:19

## 2020-02-10 RX ADMIN — Medication 40 MILLIEQUIVALENT(S): at 13:06

## 2020-02-10 RX ADMIN — Medication 600 MILLIGRAM(S): at 05:19

## 2020-02-10 RX ADMIN — Medication 100 MICROGRAM(S): at 05:19

## 2020-02-10 RX ADMIN — Medication 200 MILLIGRAM(S): at 05:20

## 2020-02-10 RX ADMIN — Medication 3 MILLILITER(S): at 13:41

## 2020-02-10 NOTE — PROGRESS NOTE ADULT - PROBLEM SELECTOR PLAN 4
-CT chest noncontrast showed 4.4 cm fusiform aneurysmal dilatation of the ascending aorta; pneumobilia (correlate for history of ERCP with sphincterotomy or similar biliary intervention. In the absence of such history the presence of gas in the   bile ducts may signify cholangitis or juanita-enteric fistula); fluid attenuation lesion at/near the expected position of the common bile duct which may be dilated common bile ducts/choledochal cyst or could be a duodenal diverticulum. -CT chest noncontrast showed 4.4 cm fusiform aneurysmal dilatation of the ascending aorta. Pneumobilia was noted. As per patient's daughter, patient had ERCP performed. Fluid attenuation lesion seen at/near the expected position of the common bile duct which may be dilated common bile ducts/choledochal cyst or could be a duodenal diverticulum. -CT chest noncontrast showed 4.4 cm fusiform aneurysmal dilatation of the ascending aorta. This was discussed with patient's daughter to follow up with patient's cardiologist.  -Chest CT showed pneumobilia and duodenal diverticulum. This was discussed with patient's outpatient gastroenterologist, Dr. Garcia. As per Dr. Garcia, patient had ERCP with sphincterotomy for multiple stones. 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

## 2020-02-10 NOTE — DISCHARGE NOTE NURSING/CASE MANAGEMENT/SOCIAL WORK - PATIENT PORTAL LINK FT
You can access the FollowMyHealth Patient Portal offered by [facility] by registering at the following website: [email or web address]. By joining [facility]’s FollowMyHealth portal, you will also be able to view your health information using other applications (apps) compatible with our system.

## 2020-02-10 NOTE — PROGRESS NOTE ADULT - SUBJECTIVE AND OBJECTIVE BOX
[facility] Cardiology Progress Note [phone] (Dr. Camara, Ken, Delores, Yajaira)    CHIEF COMPLAINT: Patient is a 89y old  Female who presents with a chief complaint of wheezing (10 Feb 2020 07:34)      Follow Up Today: The patient denies any chest discomfort or shortness of breath.    HPI:  89F w/pmh of Afib on Eliquis,  Diastolic CHF, s/p PPM, HTN, HLD, hypothyroidism presenting with cough, congestion and wheezing x 4 days. Patient states that since Wednesday she has been having a runny nose and productive cough that has been progressively getting worse.  Patient has allergic rhinitis however states that this was different as her nose was leaking green colored discharge.  She then went to her PCP and was prescribed amoxicillin and tessalon pearls which she has been taking for 1 day however this morning, daughter at bedside states she was more weak and wheezing therefore they decided to bring her in.  Patient has cough baseline however this is worse and productive of sputum and states that she feels like something is stuck in her throat.  Denies recent travel, post nasal drip, chest pain, SOB (other than baseline), abd pain, diarrhea/ constipation, nausea/ vomiting, muscle aches/ pains or sick contacts.  Patient admits to some chest discomfort a few days ago when she was getting up from chair but feels that she was straining herself and it resolved, has not had pain since then.  Denies chest pain or SOB with ambulation.  She is able to sleep flat without orthopnea however sleeps on sides as it is more comfortable.  She is on lasix at home, however did not take her dose today.  Family states that she likes chinese food and junk food however for the past month has been very diligent about what she is eating and maintaining low salt diet.  Patient also states that about 3 weeks ago, her legs "buckled" and she fell on her b/l knees, was able to get up and walk around, since then has been getting PT and now has [facility] set up.      In the ED, patient's vitals were: T97.5F, HR 67, /71, RR 22 and SpO2 93% RA. Significant labs include: cbc, cmp wnl. BNP 1454. Flu/RSV negative. Trop .016  Pt received: Solumedrol 125mg IV x1, Lasix 40mg IVx1. Blood cx collected.   CXR: blunting of L costophrenic angle unchanged from before, RLL haziness present unchanged from before (wet read)  EKG: NSR with old ST abnormalities and TWI and regular PVCs (09 Feb 2020 19:07)      PAST MEDICAL & SURGICAL HISTORY:  Hypothyroidism  HLD (hyperlipidemia)  HTN (hypertension)  Afib  CHF (congestive heart failure): Diastolic CHF  Artificial pacemaker: [employer] -last checked  Dec 2019      MEDICATIONS  (STANDING):  albuterol/ipratropium for Nebulization 3 milliLiter(s) Nebulizer three times a day  apixaban 2.5 milliGRAM(s) Oral every 12 hours  atorvastatin 10 milliGRAM(s) Oral at bedtime  benzonatate 100 milliGRAM(s) Oral three times a day  diltiazem    milliGRAM(s) Oral daily  folic acid 1 milliGRAM(s) Oral daily  furosemide   Injectable 40 milliGRAM(s) IV Push daily  guaiFENesin  milliGRAM(s) Oral every 12 hours  levothyroxine 100 MICROGram(s) Oral daily  metoprolol succinate  milliGRAM(s) Oral daily  pantoprazole    Tablet 40 milliGRAM(s) Oral before breakfast    MEDICATIONS  (PRN):  ALBUTerol    90 MICROgram(s) HFA Inhaler 1 Puff(s) Inhalation every 6 hours PRN Shortness of Breath and/or Wheezing      Allergies    No Known Allergies    Intolerances        REVIEW OF SYSTEMS:    All other review of systems is negative unless indicated above    Vital Signs Last 24 Hrs  T(C): 36.9 (10 Feb 2020 08:01), Max: 37.1 (10 Feb 2020 05:30)  T(F): 98.5 (10 Feb 2020 08:01), Max: 98.7 (10 Feb 2020 05:30)  HR: 66 (10 Feb 2020 08:01) (65 - 79)  BP: 141/71 (10 Feb 2020 08:01) (117/70 - 141/71)  BP(mean): --  RR: 18 (10 Feb 2020 08:01) (16 - 24)  SpO2: 98% (10 Feb 2020 08:01) (92% - 98%)    I&O's Summary      PHYSICAL EXAM:    Constitutional: NAD, awake and alert, well-developed  Eyes:  EOMI,  Pupils round, No oral cyanosis.  HEENT: No exudate or erythema  Pulmonary: Non-labored, breath sounds are clear bilaterally, No wheezing, rales or rhonchi  Cardiovascular: Regular, S1 and S2, No murmurs, rubs, gallops oir clicks  Gastrointestinal: Bowel Sounds present, soft, nontender.   Ext: No significant LE edema with good pulses x 4  Neurological: Alert, no gross focal motor deficits  Skin: No rashes.  Psych:  Mood & affect appropriate    LABS: All Labs Reviewed:                        14.2   4.18  )-----------( 202      ( 10 Feb 2020 07:23 )             45.1                         13.9   5.08  )-----------( 179      ( 09 Feb 2020 16:17 )             44.3     10 Feb 2020 07:23    140    |  101    |  22     ----------------------------<  160    3.6     |  30     |  1.30   09 Feb 2020 16:17    138    |  103    |  19     ----------------------------<  94     4.6     |  29     |  1.10     Ca    9.4        10 Feb 2020 07:23  Ca    9.1        09 Feb 2020 16:17    TPro  7.7    /  Alb  2.8    /  TBili  1.0    /  DBili  x      /  AST  39     /  ALT  26     /  AlkPhos  122    09 Feb 2020 16:17    PT/INR - ( 09 Feb 2020 16:17 )   PT: 15.2 sec;   INR: 1.34 ratio         PTT - ( 09 Feb 2020 16:17 )  PTT:29.8 sec  CARDIAC MARKERS ( 09 Feb 2020 16:17 )  .016 ng/mL / x     / 58 U/L / x     / x          Blood Culture:   02-09 @ 16:17  Pro Bnp 1454        RADIOLOGY/EKG:  < from: CT Chest No Cont (02.10.20 @ 02:45) >    PROCEDURE DATE:  02/10/2020    ******PRELIMINARY REPORT******    ******PRELIMINARY REPORT******              INTERPRETATION:  [facility] RADIOLOGIST PRELIMINARY REPORT    PROCEDURE INFORMATION:   Exam: CT Chest Without Contrast   Exam date and time: 2/10/2020 2:30 AM   Age: 89 years old   Clinical indication: Shortness of breath and wheezing; Prior surgery; Surgery   date: 6+ months; Surgery type: Pacemaker     TECHNIQUE:   Imaging protocol: Computed tomography of the chest without contrast.     COMPARISON:   DX XR CHEST 2/9/2020 5:26 PM     FINDINGS:   Lungs: Mild multifocal bronchiolitis in the right lung. There are a few small   indeterminate pulmonary nodules measuring up to 7 mm in size. Low lung volumes   with expiratory phase imaging and respiratory motion.   Pleural space: Unremarkable. No pneumothorax. No pleural effusion.   Heart: Cardiomegaly. Cardiac device in place.   Mediastinum: Esophagus is unremarkable.   Aorta: 4.4 cm fusiform aneurysmal dilatation of the ascending aorta. No   rupture.   Lymph nodes: Unremarkable. No enlarged lymph nodes.   Gallbladder and bile ducts: Cholelithiasis. No cholecystitis. Pneumobilia.   There is a fluid attenuation lesion at/near the expected position of the common   bile duct which may be dilated common bile ducts/choledochal cyst or could be a   duodenal diverticulum.   Kidneys and ureters: Incompletely visualized left-sided hydronephrosis in the   setting of a left extrarenal pelvis, raising the possibility of nonvisualized   mid-distal ureteral obstruction.   Stomach and bowel: Colonic diverticulosis.   Bones/joints: Scoliosis.   Soft tissues: Unremarkable.     IMPRESSION:   1. 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 No rupture.   2. Mild multifocal bronchiolitis in the right lung.   3. Pneumobilia. Correlate for history of ERCP with sphincterotomy or similar   biliary intervention. In theabsence of such history the presence of gas in the   bile ducts may signify cholangitis or juanita-enteric fistula.   4. There is a fluid attenuation lesion at/near the expected position of the   common bile duct which may be dilated common bile ducts/choledochal cyst or   could be a duodenal diverticulum.            < end of copied text >    Attending Attestation:   20 minutes spent on total encounter; more than 50% of the visit was spent counseling and/or coordinating care by the attending physician.     ASSESSMENT AND PLAN

## 2020-02-10 NOTE — PROGRESS NOTE ADULT - ASSESSMENT
89F PMH of Afib on Eliquis, CHF, PPM, HTN, HLD, hypothyroidism presenting with cough, congestion and wheezing admitted with Ac on Chronic Diastolic CHF exacerbation and possible viral infection, with failed outpatient abx, r/o PNA.

## 2020-02-10 NOTE — PROGRESS NOTE ADULT - PROBLEM SELECTOR PLAN 2
-Positive for hMPV. Patient afebrile without leukocytosis.  -s/p amoxicillin x1 day outpatient.  -RSV/flu negative, f/u RVP.  -Supportive treatment with Duoneb, cough suppressants, Mucinex.  -CT chest mild multifocal bronchiolitis in the right lung.   -Monitor off abx for now  -Follow up blood cultures x2. -Positive for hMPV. Patient afebrile without leukocytosis.  -s/p amoxicillin x1 day outpatient.  -RSV/flu negative, f/u RVP.  -Supportive treatment with Duoneb, cough suppressants, Mucinex.  -CT chest mild multifocal bronchiolitis in the right lung. CXR showed clear lungs.  -Monitor off abx for now  -Follow up blood cultures x2. -Positive for hMPV. Patient afebrile without leukocytosis.  -s/p amoxicillin x1 day outpatient.  -RSV/flu negative, f/u RVP.  -Supportive treatment with Duoneb, cough suppressants, Mucinex.  --Prednisone 20 mg PO started on 2/10 x 5 days.with food & PPI  -CT chest mild multifocal bronchiolitis in the right lung. CXR showed clear lungs.  -Monitor off abx for now  -Follow up blood cultures x2.

## 2020-02-10 NOTE — GOALS OF CARE CONVERSATION - ADVANCED CARE PLANNING - CONVERSATION DETAILS
met pt with daughter , unsure where hcp located, pt completed hcp, her two daughters are hcp. pt spoke of her resuscitation wishes, wants a chance, does not want to live on machines. PC RN contact # given

## 2020-02-10 NOTE — ED ADULT NURSE REASSESSMENT NOTE - NS ED NURSE REASSESS COMMENT FT1
Patient received from RHEA Melgar in stable condition. Patient states she feels a lot better. VSS. Comfort/safety measures maintained. Respiratory at bedside for respiratory treatments. Will cont to monitor
patient discharged home. all paperwork given and discussed with patient and daughter. Patient verbalized understanding and used teach back. PIV removed with no complications. Daughter at bedside. Vital signs stable. Patient escorted out via wheelchair with staff.

## 2020-02-10 NOTE — PHYSICAL THERAPY INITIAL EVALUATION ADULT - ADDITIONAL COMMENTS
Pt lives in a mother/daughter house (side- by side ) with her daughter. Few steps to enter/exit only. Pt ambulates independently with RW and requires assistance for some ADLs. Pt has aide 6xwk for 6 hours.

## 2020-02-10 NOTE — PROGRESS NOTE ADULT - SUBJECTIVE AND OBJECTIVE BOX
DOCUMENTATION IN PROGRESS    Patient is a 89y old  Female who presents with a chief complaint of wheezing (09 Feb 2020 22:04)      INTERVAL HPI: 89 year old female with past medical history of Afib on Eliquis, CHF, s/p PPM, HTN, HLD, hypothyroidism presented with cough, congestion and wheezing x 4 days. Patient stated that since Wednesday she had been having a runny nose and productive cough that had been progressively worsening. Patient had allergic rhinitis, however stated that this was different as her nose was leaking green colored discharge. She then went to her PCP and was prescribed amoxicillin and tessalon perles which she has been taking for 1 day. However, the morning of admission, the daughter at bedside stated she was more weak and wheezing therefore they decided to bring her in.  Patient had cough at baseline however this was worse and productive of sputum and stated that she felt as if something was stuck in her throat. Denied recent travel, postnasal drip, chest pain, SOB (other than baseline), abd pain, diarrhea, constipation, nausea, vomiting, muscle aches or pains or sick contacts. Patient admitted to some chest discomfort a few days ago when she was getting up from chair but felt that she was straining herself and it resolved.  Denied chest pain or SOB with ambulation. Patient was able to lay flat. She was on Lasix at home, however did not take her dose day of admission.  Family stated that she liked Chinese food and junk food, however for the past month, patient had been very diligent about what she was eating and maintained a low salt diet.  Patient also stated that about 3 weeks prior to admission, her legs "buckled" and she fell on her b/l knees, was able to get up and walk around, since then has been getting PT and now has [facility] set up.        OVERNIGHT EVENTS:  2/9/2020: Pt seen and examined at bedside. Pt admitted for CHF exacerbation and viral URI. Pt received Solumedrol 125 mg IV x1, Lasix 40 mg IV x1. CXR showed blunting of L costophrenic angle (unchanged), RLL haziness. Duonebs, cough suppressants, Mucinex given. Cardio on board.   2/10/2020: Pt seen and examined at bedside. Positive for hMPV. Chest CT showed 4.4 cm fusiform aneurysmal dilation of ascending aorta, mild multifocal bronchiolitis in R lung, pneumobilia (may signify cholangitis or juanita-enteric fistula); possible dilated CBD/choledochal cyst or duodenal diverticulum.      Home Medications:  dilTIAZem 120 mg/12 hours oral capsule, extended release: orally once a day (09 Feb 2020 20:14)  Eliquis 2.5 mg oral tablet: orally 2 times a day (09 Feb 2020 20:14)  folic acid: 1  orally once a day (09 Feb 2020 20:14)  furosemide 20 mg oral tablet: 1 tab(s) orally 3 times a week on TTSat (09 Feb 2020 20:14)  furosemide 40 mg oral tablet: 1 tab(s) orally 4 times a week on MWFSun (09 Feb 2020 20:14)  levothyroxine 100 mcg (0.1 mg) oral tablet: 1  orally once a day (09 Feb 2020 20:14)  lovastatin 20 mg oral tablet: orally once a day (09 Feb 2020 20:14)  metoprolol succinate 200 mg oral tablet, extended release: 1 tab(s) orally once a day (09 Feb 2020 20:14)  omeprazole 40 mg oral delayed release capsule: 1 cap(s) orally once a day (09 Feb 2020 20:14)  potassium chloride 10 mEq oral capsule, extended release: 1 cap(s) orally 4 times a week (09 Feb 2020 20:14)    MEDICATIONS  (STANDING):  albuterol/ipratropium for Nebulization 3 milliLiter(s) Nebulizer three times a day  apixaban 2.5 milliGRAM(s) Oral every 12 hours  atorvastatin 10 milliGRAM(s) Oral at bedtime  benzonatate 100 milliGRAM(s) Oral three times a day  diltiazem    milliGRAM(s) Oral daily  folic acid 1 milliGRAM(s) Oral daily  furosemide   Injectable 40 milliGRAM(s) IV Push daily  guaiFENesin  milliGRAM(s) Oral every 12 hours  levothyroxine 100 MICROGram(s) Oral daily  metoprolol succinate  milliGRAM(s) Oral daily  pantoprazole    Tablet 40 milliGRAM(s) Oral before breakfast    MEDICATIONS  (PRN):  ALBUTerol    90 MICROgram(s) HFA Inhaler 1 Puff(s) Inhalation every 6 hours PRN Shortness of Breath and/or Wheezing    Allergies  No Known Allergies    Social History:  Lives alone however house is attached to daughter Ladi's home.  Has home aid for 6 hours a day  Ambulates with walker  Tobacco: quit many years ago (very distant)  Etoh: denies  Illicit Drug: denies (09 Feb 2020 19:07)    REVIEW OF SYSTEMS:  CONSTITUTIONAL: No fever, No chills, No fatigue, No myalgia, No body ache, No weakness  EYES: No eye pain,  No visual disturbances, No discharge, No Redness.  ENMT:  No ear pain, No nose bleed, No vertigo; No sinus pain, No throat pain, No congestion.  NECK: No pain, No stiffness.  RESPIRATORY: No cough, No wheezing, No  hemoptysis, No shortness of breath.  CARDIOVASCULAR: No chest pain, palpitations.  GASTROINTESTINAL: No abdominal pain, No epigastric pain. No nausea, No vomiting; No diarrhea, No constipation. [  ] BM  GENITOURINARY: No dysuria, No frequency, No urgency, No hematuria, No incontinence  NEUROLOGICAL: No headaches, No dizziness, No numbness, No tingling, No tremors, No weakness  EXT: No Swelling, No pain., No edema.  SKIN:  [  ] No itching, burning, rashes, or lesions   MUSCULOSKELETAL: No joint pain or swelling; No muscle pain, No back pain, No extremity pain.  PSYCHIATRIC: No depression,  No anxiety,  No mood swings, No difficulty sleeping at night.  PAIN SCALE: [  ] None  [  ] Other-  ROS Unable to obtain due to - [  ] Dementia  [  ] Lethargy [  ] Drowsy [  ] Sedated [  ] non verbal  REST OF REVIEW Of SYSTEM - [  ] Normal     Vital Signs Last 24 Hrs  T(C): 37.1 (10 Feb 2020 05:30), Max: 37.1 (10 Feb 2020 05:30)  T(F): 98.7 (10 Feb 2020 05:30), Max: 98.7 (10 Feb 2020 05:30)  HR: 77 (10 Feb 2020 05:30) (65 - 77)  BP: 117/78 (10 Feb 2020 05:30) (117/70 - 136/72)  BP(mean): --  RR: 16 (10 Feb 2020 05:30) (16 - 24)  SpO2: 95% (10 Feb 2020 05:30) (92% - 96%)  Finger Stick    PHYSICAL EXAM:  GENERAL:  [  ] NAD , [  ] well appearing, [  ] Agitated, [  ] Drowsy,  [  ] Lethargy, [  ] confused   HEAD:  [  ] Normal, [  ] Other  EYES:  [  ] EOMI, [  ] PERRLA, [  ] conjunctiva and sclera clear normal, [  ] Other,  [  ] Pallor,[  ] Discharge  ENMT:  [  ] Normal, [  ] Moist mucous membranes, [  ] Good dentition, [  ] No Thrush  NECK:  [  ] Supple, [  ] No JVD, [  ] Normal thyroid, [  ] Lymphadenopathy [  ] Other  CHEST/LUNG:  [  ] Clear to auscultation bilaterally, [  ] Breath Sounds equal B/L / Decrease, [  ] poor effort  [  ] No rales, [  ] No rhonchi  [  ]  No wheezing,   HEART:  [  ] Regular rate and rhythm, [  ] tachycardia, [  ] Bradycardia,  [  ] irregular  [  ] No murmurs, No rubs, No gallops, [  ] PPM in place (Mfr:  )  ABDOMEN:  [  ] Soft, [  ] Nontender, [  ] Nondistended, [  ] No mass, [  ] Bowel sounds present, [  ] obese  NERVOUS SYSTEM:  [  ] Alert & Oriented X3, [  ] Nonfocal  [  ] Confusion  [  ] Encephalopathic [  ] Sedated [  ] Unable to assess, [  ] Dementia [  ] Other-  EXTREMITIES: [  ] 2+ Peripheral Pulses, No clubbing, No cyanosis,  [  ] edema B/L lower EXT. [  ] PVD stasis skin changes B/L Lower EXT, [  ] wound  LYMPH: No lymphadenopathy noted  SKIN:  [  ] No rashes or lesions, [  ] Pressure Ulcers, [  ] ecchymosis, [  ] Skin Tears, [  ] Other    DIET: DASH/TLC    LABS: PENDING BMP                          14.2   4.18  )-----------( 202      ( 10 Feb 2020 07:23 )             45.1     CARDIAC MARKERS ( 09 Feb 2020 16:17 )  .016 ng/mL / x     / 58 U/L / x     / x        Serum Pro-Brain Natriuretic Peptide: 1454 pg/mL (02-09-20 @ 16:17)      RADIOLOGY & ADDITIONAL TESTS:  CT Chest Non-contrast (2/10)  1. 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 No rupture.   2. Mild multifocal bronchiolitis in the right lung.   3. Pneumobilia. Correlate for history of ERCP with sphincterotomy or similar   biliary intervention. In the absence of such history the presence of gas in the   bile ducts may signify cholangitis or juanita-enteric fistula.   4. There is a fluid attenuation lesion at/near the expected position of the   common bile duct which may be dilated common bile ducts/choledochal cyst or could be a duodenal diverticulum.    HEALTH ISSUES - PROBLEM Dx:  Need for prophylactic measure: Need for prophylactic measure  Pacemaker: Pacemaker  Upper respiratory tract infection, unspecified type: Upper respiratory tract infection, unspecified type  Congestive heart failure, unspecified HF chronicity, unspecified heart failure type: Congestive heart failure, unspecified HF chronicity, unspecified heart failure type  Hypothyroidism: Hypothyroidism  HLD (hyperlipidemia): HLD (hyperlipidemia)  Afib: Afib  HTN (hypertension): HTN (hypertension)      Consultant(s) Notes Reviewed:  [  ] YES     Care Discussed with [X] Consultants  [ X ] Patient  [  ] Family [  ] HCP [  ]   [  ] Social Service  [  ] RN, [  ] Physical Therapy,[  ] Palliative care team  DVT PPX: [  ] Lovenox, [  ] S C Heparin, [  ] Coumadin, [  ] Xarelto, [ X ] Eliquis, [  ] Pradaxa, [  ] IV Heparin drip, [  ] SCD [  ] Contraindication 2 to GI Bleed,[  ] Ambulation [  ] Contraindicated 2 to  bleed [  ] Contraindicated 2 to Brain Bleed  Advanced directive: [ X ] None, [  ] DNR/DNI Patient is a 89y old  Female who presents with a chief complaint of wheezing (09 Feb 2020 22:04)      INTERVAL HPI: 89 year old female with past medical history of Afib on Eliquis, CHF, s/p PPM, HTN, HLD, hypothyroidism presented with cough, congestion and wheezing x 4 days. Patient stated that since Wednesday she had been having a runny nose and productive cough that had been progressively worsening. Patient had allergic rhinitis, however stated that this was different as her nose was leaking green colored discharge. She then went to her PCP and was prescribed amoxicillin and tessalon perles which she has been taking for 1 day. However, the morning of admission, the daughter at bedside stated she was more weak and wheezing therefore they decided to bring her in.  Patient had cough at baseline however this was worse and productive of sputum and stated that she felt as if something was stuck in her throat. Denied recent travel, postnasal drip, chest pain, SOB (other than baseline), abd pain, diarrhea, constipation, nausea, vomiting, muscle aches or pains or sick contacts. Patient admitted to some chest discomfort a few days ago when she was getting up from chair but felt that she was straining herself and it resolved.  Denied chest pain or SOB with ambulation. Patient was able to lay flat. She was on Lasix at home, however did not take her dose day of admission.  Family stated that she liked Chinese food and junk food, however for the past month, patient had been very diligent about what she was eating and maintained a low salt diet.  Patient also stated that about 3 weeks prior to admission, her legs "buckled" and she fell on her b/l knees, was able to get up and walk around, since then has been getting PT and now has [facility] set up.      OVERNIGHT EVENTS:  2/9/2020: Pt seen and examined at bedside. Pt admitted for CHF exacerbation and viral URI. Pt received Solumedrol 125 mg IV x1, Lasix 40 mg IV x1. CXR showed blunting of L costophrenic angle (unchanged), RLL haziness. Duonebs, cough suppressants, Mucinex given. Cardio on board.   2/10/2020: Pt seen and examined at bedside. Patient reports to be feeling a little better. Denied any wheezing, although continues to have a productive cough. Patient on 2.5 L O2 NC. Patient eager for discharge. Positive for hMPV. Chest CT showed 4.4 cm fusiform aneurysmal dilation of ascending aorta, mild multifocal bronchiolitis in R lung, pneumobilia (may signify cholangitis or juanita-enteric fistula); possible dilated CBD/choledochal cyst or duodenal diverticulum.    Home Medications:  dilTIAZem 120 mg/12 hours oral capsule, extended release: orally once a day (09 Feb 2020 20:14)  Eliquis 2.5 mg oral tablet: orally 2 times a day (09 Feb 2020 20:14)  folic acid: 1  orally once a day (09 Feb 2020 20:14)  furosemide 20 mg oral tablet: 1 tab(s) orally 3 times a week on TTSat (09 Feb 2020 20:14)  furosemide 40 mg oral tablet: 1 tab(s) orally 4 times a week on MWFSun (09 Feb 2020 20:14)  levothyroxine 100 mcg (0.1 mg) oral tablet: 1  orally once a day (09 Feb 2020 20:14)  lovastatin 20 mg oral tablet: orally once a day (09 Feb 2020 20:14)  metoprolol succinate 200 mg oral tablet, extended release: 1 tab(s) orally once a day (09 Feb 2020 20:14)  omeprazole 40 mg oral delayed release capsule: 1 cap(s) orally once a day (09 Feb 2020 20:14)  potassium chloride 10 mEq oral capsule, extended release: 1 cap(s) orally 4 times a week (09 Feb 2020 20:14)    MEDICATIONS  (STANDING):  albuterol/ipratropium for Nebulization 3 milliLiter(s) Nebulizer three times a day  apixaban 2.5 milliGRAM(s) Oral every 12 hours  atorvastatin 10 milliGRAM(s) Oral at bedtime  benzonatate 100 milliGRAM(s) Oral three times a day  diltiazem    milliGRAM(s) Oral daily  folic acid 1 milliGRAM(s) Oral daily  furosemide   Injectable 40 milliGRAM(s) IV Push daily  guaiFENesin  milliGRAM(s) Oral every 12 hours  levothyroxine 100 MICROGram(s) Oral daily  metoprolol succinate  milliGRAM(s) Oral daily  pantoprazole    Tablet 40 milliGRAM(s) Oral before breakfast    MEDICATIONS  (PRN):  ALBUTerol    90 MICROgram(s) HFA Inhaler 1 Puff(s) Inhalation every 6 hours PRN Shortness of Breath and/or Wheezing    Allergies  No Known Allergies    Social History:  Lives alone however house is attached to daughter Ladi's home.  Has home aid for 6 hours a day  Ambulates with walker  Tobacco: quit many years ago (very distant)  Etoh: denies  Illicit Drug: denies (09 Feb 2020 19:07)    REVIEW OF SYSTEMS:  CONSTITUTIONAL: No fever, No chills, No fatigue, No myalgia, No body ache, No weakness  EYES: No eye pain,  No visual disturbances, No discharge, No Redness.  ENMT:  No ear pain, No nose bleed, No vertigo; No sinus pain, No throat pain, No congestion.  NECK: No pain, No stiffness.  RESPIRATORY: Admits to productive cough. No wheezing, No  hemoptysis, No shortness of breath.  CARDIOVASCULAR: No chest pain, palpitations.  GASTROINTESTINAL: No abdominal pain, No epigastric pain. No nausea, No vomiting; No diarrhea, No constipation. [  ] BM  GENITOURINARY: No dysuria, No frequency, No urgency, No hematuria, No incontinence  NEUROLOGICAL: No headaches, No dizziness, No numbness, No tingling, No tremors, No weakness  EXT: No Swelling, No pain., No edema.  SKIN:  [ X ] No itching, burning, rashes, or lesions   MUSCULOSKELETAL: No joint pain or swelling; No muscle pain, No back pain, No extremity pain.  PSYCHIATRIC: No depression,  No anxiety,  No mood swings, No difficulty sleeping at night.  PAIN SCALE: [ X ] None  [  ] Other-  ROS Unable to obtain due to - [  ] Dementia  [  ] Lethargy [  ] Drowsy [  ] Sedated [  ] non verbal  REST OF REVIEW Of SYSTEM - [ X ] Normal     Vital Signs Last 24 Hrs  T(C): 37.1 (10 Feb 2020 05:30), Max: 37.1 (10 Feb 2020 05:30)  T(F): 98.7 (10 Feb 2020 05:30), Max: 98.7 (10 Feb 2020 05:30)  HR: 77 (10 Feb 2020 05:30) (65 - 77)  BP: 117/78 (10 Feb 2020 05:30) (117/70 - 136/72)  BP(mean): --  RR: 16 (10 Feb 2020 05:30) (16 - 24)  SpO2: 95% (10 Feb 2020 05:30) (92% - 96%)  Finger Stick    PHYSICAL EXAM:  GENERAL:  [ X ] NAD , [ X ] well appearing, [  ] Agitated, [  ] Drowsy,  [  ] Lethargy, [  ] confused   HEAD:  [X  ] Normal, [  ] Other  EYES:  [ X ] EOMI, [X  ] PERRLA, [ X ] conjunctiva and sclera clear normal, [  ] Other,  [  ] Pallor,[  ] Discharge  ENMT:  [ X ] Normal, [ X ] Moist mucous membranes, [  ] Good dentition, [  ] No Thrush  NECK:  [ X ] Supple, [  ] No JVD, [  ] Normal thyroid, [  ] Lymphadenopathy [  ] Other  CHEST/LUNG: [X ] Rhonchi at B/L bases [  ] Clear to auscultation bilaterally, [  ] Breath Sounds equal B/L / Decrease, [  ] poor effort  [  ] No rales, [  ] No rhonchi  [  ]  No wheezing,   HEART:  [ X ] Regular rate and rhythm, [  ] tachycardia, [  ] Bradycardia,  [  ] irregular  [  ] No murmurs, No rubs, No gallops, [  ] PPM in place (Mfr:  )  ABDOMEN:  [ X ] Soft, [ X ] Nontender, [X  ] Nondistended, [  ] No mass, [  ] Bowel sounds present, [  ] obese  NERVOUS SYSTEM:  [X  ] Alert & Oriented X3, [  ] Nonfocal  [  ] Confusion  [  ] Encephalopathic [  ] Sedated [  ] Unable to assess, [  ] Dementia [  ] Other-  EXTREMITIES: [X  ] 2+ Peripheral Pulses, No clubbing, No cyanosis,  [  ] edema B/L lower EXT. [  ] PVD stasis skin changes B/L Lower EXT, [  ] wound  LYMPH: No lymphadenopathy noted  SKIN:  [ X ] No rashes or lesions, [  ] Pressure Ulcers, [  ] ecchymosis, [  ] Skin Tears, [  ] Other    DIET: DASH/TLC    LABS:                          14.2   4.18  )-----------( 202      ( 10 Feb 2020 07:23 )             45.1     02-10    140  |  101  |  22  ----------------------------<  160<H>  3.6   |  30  |  1.30    Ca    9.4      10 Feb 2020 07:23    TPro  7.7  /  Alb  2.8<L>  /  TBili  1.0  /  DBili  x   /  AST  39<H>  /  ALT  26  /  AlkPhos  122<H>  02-09  PT/INR - ( 09 Feb 2020 16:17 )   PT: 15.2 sec;   INR: 1.34 ratio    PTT - ( 09 Feb 2020 16:17 )  PTT:29.8 sec    CARDIAC MARKERS ( 09 Feb 2020 16:17 )  .016 ng/mL / x     / 58 U/L / x     / x        Serum Pro-Brain Natriuretic Peptide: 1454 pg/mL (02-09-20 @ 16:17)      RADIOLOGY & ADDITIONAL TESTS:  CT Chest Non-contrast (2/10)  1. 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 No rupture.   2. Mild multifocal bronchiolitis in the right lung.   3. Pneumobilia. Correlate for history of ERCP with sphincterotomy or similar   biliary intervention. In the absence of such history the presence of gas in the   bile ducts may signify cholangitis or juanita-enteric fistula.   4. There is a fluid attenuation lesion at/near the expected position of the   common bile duct which may be dilated common bile ducts/choledochal cyst or could be a duodenal diverticulum.    CXR (2/9): Cardiomegaly. Grossly clear lungs.    MICROBIOLOGY  Rapid RVP (2/9): hMPV detected. Negative for Flu A/B.    HEALTH ISSUES - PROBLEM Dx:  Need for prophylactic measure: Need for prophylactic measure  Pacemaker: Pacemaker  Upper respiratory tract infection, unspecified type: Upper respiratory tract infection, unspecified type  Congestive heart failure, unspecified HF chronicity, unspecified heart failure type: Congestive heart failure, unspecified HF chronicity, unspecified heart failure type  Hypothyroidism: Hypothyroidism  HLD (hyperlipidemia): HLD (hyperlipidemia)  Afib: Afib  HTN (hypertension): HTN (hypertension)    Consultant(s) Notes Reviewed:  [  ] YES     Care Discussed with [X] Consultants  [ X ] Patient  [  ] Family [  ] HCP [  ]   [  ] Social Service  [  ] RN, [  ] Physical Therapy,[  ] Palliative care team  DVT PPX: [  ] Lovenox, [  ] S C Heparin, [  ] Coumadin, [  ] Xarelto, [ X ] Eliquis, [  ] Pradaxa, [  ] IV Heparin drip, [  ] SCD [  ] Contraindication 2 to GI Bleed,[  ] Ambulation [  ] Contraindicated 2 to  bleed [  ] Contraindicated 2 to Brain Bleed  Advanced directive: [ X ] None, [  ] DNR/DNI Patient is a 89y old  Female who presents with a chief complaint of wheezing (09 Feb 2020 22:04)      INTERVAL HPI: 89 year old female with past medical history of Afib on Eliquis, CHF, s/p PPM, HTN, HLD, hypothyroidism presented with cough, congestion and wheezing x 4 days. Patient stated that since Wednesday she had been having a runny nose and productive cough that had been progressively worsening. Patient had allergic rhinitis, however stated that this was different as her nose was leaking green colored discharge. She then went to her PCP and was prescribed amoxicillin and tessalon perles which she has been taking for 1 day. However, the morning of admission, the daughter at bedside stated she was more weak and wheezing therefore they decided to bring her in.  Patient had cough at baseline however this was worse and productive of sputum and stated that she felt as if something was stuck in her throat. Denied recent travel, postnasal drip, chest pain, SOB (other than baseline), abd pain, diarrhea, constipation, nausea, vomiting, muscle aches or pains or sick contacts. Patient admitted to some chest discomfort a few days ago when she was getting up from chair but felt that she was straining herself and it resolved.  Denied chest pain or SOB with ambulation. Patient was able to lay flat. She was on Lasix at home, however did not take her dose day of admission.  Family stated that she liked Chinese food and junk food, however for the past month, patient had been very diligent about what she was eating and maintained a low salt diet.  Patient also stated that about 3 weeks prior to admission, her legs "buckled" and she fell on her b/l knees, was able to get up and walk around, since then has been getting PT and now has [facility] set up.      OVERNIGHT EVENTS:  2/9/2020: Pt seen and examined at bedside. Pt admitted for CHF exacerbation and viral URI. Pt received Solumedrol 125 mg IV x1, Lasix 40 mg IV x1. CXR showed blunting of L costophrenic angle (unchanged), RLL haziness. Duonebs, cough suppressants, Mucinex given. Cardio on board.   2/10/2020: Pt seen and examined at bedside. Patient reports to be feeling a little better. Denied any wheezing, although continues to have a productive cough. Patient on 2.5 L O2 NC. Patient eager for discharge. Positive for hMPV. Chest CT showed 4.4 cm fusiform aneurysmal dilation of ascending aorta, mild multifocal bronchiolitis in R lung, pneumobilia (may signify cholangitis or juanita-enteric fistula); possible dilated CBD/choledochal cyst or duodenal diverticulum.    Home Medications:  dilTIAZem 120 mg/12 hours oral capsule, extended release: orally once a day (09 Feb 2020 20:14)  Eliquis 2.5 mg oral tablet: orally 2 times a day (09 Feb 2020 20:14)  folic acid: 1  orally once a day (09 Feb 2020 20:14)  furosemide 20 mg oral tablet: 1 tab(s) orally 3 times a week on TTSat (09 Feb 2020 20:14)  furosemide 40 mg oral tablet: 1 tab(s) orally 4 times a week on MWFSun (09 Feb 2020 20:14)  levothyroxine 100 mcg (0.1 mg) oral tablet: 1  orally once a day (09 Feb 2020 20:14)  lovastatin 20 mg oral tablet: orally once a day (09 Feb 2020 20:14)  metoprolol succinate 200 mg oral tablet, extended release: 1 tab(s) orally once a day (09 Feb 2020 20:14)  omeprazole 40 mg oral delayed release capsule: 1 cap(s) orally once a day (09 Feb 2020 20:14)  potassium chloride 10 mEq oral capsule, extended release: 1 cap(s) orally 4 times a week (09 Feb 2020 20:14)    MEDICATIONS  (STANDING):  albuterol/ipratropium for Nebulization 3 milliLiter(s) Nebulizer three times a day  apixaban 2.5 milliGRAM(s) Oral every 12 hours  atorvastatin 10 milliGRAM(s) Oral at bedtime  benzonatate 100 milliGRAM(s) Oral three times a day  diltiazem    milliGRAM(s) Oral daily  folic acid 1 milliGRAM(s) Oral daily  furosemide   Injectable 40 milliGRAM(s) IV Push daily  guaiFENesin  milliGRAM(s) Oral every 12 hours  levothyroxine 100 MICROGram(s) Oral daily  metoprolol succinate  milliGRAM(s) Oral daily  pantoprazole    Tablet 40 milliGRAM(s) Oral before breakfast    MEDICATIONS  (PRN):  ALBUTerol    90 MICROgram(s) HFA Inhaler 1 Puff(s) Inhalation every 6 hours PRN Shortness of Breath and/or Wheezing    Allergies  No Known Allergies    Social History:  Lives alone however house is attached to daughter Ladi's home.  Has home aid for 6 hours a day  Ambulates with walker  Tobacco: quit many years ago (very distant)  Etoh: denies  Illicit Drug: denies (09 Feb 2020 19:07)    REVIEW OF SYSTEMS:  CONSTITUTIONAL: No fever, No chills, No fatigue, No myalgia, No body ache, No weakness  EYES: No eye pain,  No visual disturbances, No discharge, No Redness.  ENMT:  No ear pain, No nose bleed, No vertigo; No sinus pain, No throat pain, No congestion.  NECK: No pain, No stiffness.  RESPIRATORY: Admits to productive cough. No wheezing, No  hemoptysis, No shortness of breath.  CARDIOVASCULAR: No chest pain, palpitations.  GASTROINTESTINAL: No abdominal pain, No epigastric pain. No nausea, No vomiting; No diarrhea, No constipation. [  ] BM  GENITOURINARY: No dysuria, No frequency, No urgency, No hematuria, No incontinence  NEUROLOGICAL: No headaches, No dizziness, No numbness, No tingling, No tremors, No weakness  EXT: No Swelling, No pain., No edema.  SKIN:  [ X ] No itching, burning, rashes, or lesions   MUSCULOSKELETAL: No joint pain or swelling; No muscle pain, No back pain, No extremity pain.  PSYCHIATRIC: No depression,  No anxiety,  No mood swings, No difficulty sleeping at night.  PAIN SCALE: [ X ] None  [  ] Other-  ROS Unable to obtain due to - [  ] Dementia  [  ] Lethargy [  ] Drowsy [  ] Sedated [  ] non verbal  REST OF REVIEW Of SYSTEM - [ X ] Normal     Vital Signs Last 24 Hrs  T(C): 37.1 (10 Feb 2020 05:30), Max: 37.1 (10 Feb 2020 05:30)  T(F): 98.7 (10 Feb 2020 05:30), Max: 98.7 (10 Feb 2020 05:30)  HR: 77 (10 Feb 2020 05:30) (65 - 77)  BP: 117/78 (10 Feb 2020 05:30) (117/70 - 136/72)  BP(mean): --  RR: 16 (10 Feb 2020 05:30) (16 - 24)  SpO2: 95% (10 Feb 2020 05:30) (92% - 96%)  Finger Stick    PHYSICAL EXAM:  GENERAL:  [ X ] NAD , [ X ] well appearing, [  ] Agitated, [  ] Drowsy,  [  ] Lethargy, [  ] confused   HEAD:  [X  ] Normal, [  ] Other  EYES:  [ X ] EOMI, [X  ] PERRLA, [ X ] conjunctiva and sclera clear normal, [  ] Other,  [  ] Pallor,[  ] Discharge  ENMT:  [ X ] Normal, [ X ] Moist mucous membranes, [ x ] Good dentition, [x  ] No Thrush  NECK:  [ X ] Supple, [ x ] No JVD, [x  ] Normal thyroid, [  ] Lymphadenopathy [  ] Other  CHEST/LUNG: [X ] Rhonchi at B/L bases [  ] Clear to auscultation bilaterally, [x  ] Breath Sounds equal B/L / Decrease, [  ] poor effort  [ x ] No rales, [x  ] B/L  rhonchi  [ x ]  No wheezing,   HEART:  [ X ] Regular rate and rhythm, [  ] tachycardia, [  ] Bradycardia,  [  ] irregular  [ x ] No murmurs, No rubs, No gallops, [ x ] PPM in place (Mfr:  ) [place]   ABDOMEN:  [ X ] Soft, [ X ] Nontender, [X  ] Nondistended, [x  ] No mass, [ x ] Bowel sounds present, [  ] obese  NERVOUS SYSTEM:  [X  ] Alert & Oriented X3, [ x ] Nonfocal  [  ] Confusion  [  ] Encephalopathic [  ] Sedated [  ] Unable to assess, [  ] Dementia [  ] Other-  EXTREMITIES: [X  ] 2+ Peripheral Pulses, No clubbing, No cyanosis,  [  ] edema B/L lower EXT. [  ] PVD stasis skin changes B/L Lower EXT, [  ] wound  LYMPH: No lymphadenopathy noted  SKIN:  [ X ] No rashes or lesions, [  ] Pressure Ulcers, [  ] ecchymosis, [  ] Skin Tears, [  ] Other    DIET: DASH/TLC    LABS:                          14.2   4.18  )-----------( 202      ( 10 Feb 2020 07:23 )             45.1     02-10    140  |  101  |  22  ----------------------------<  160<H>  3.6   |  30  |  1.30    Ca    9.4      10 Feb 2020 07:23    TPro  7.7  /  Alb  2.8<L>  /  TBili  1.0  /  DBili  x   /  AST  39<H>  /  ALT  26  /  AlkPhos  122<H>  02-09  PT/INR - ( 09 Feb 2020 16:17 )   PT: 15.2 sec;   INR: 1.34 ratio    PTT - ( 09 Feb 2020 16:17 )  PTT:29.8 sec    CARDIAC MARKERS ( 09 Feb 2020 16:17 )  .016 ng/mL / x     / 58 U/L / x     / x        Serum Pro-Brain Natriuretic Peptide: 1454 pg/mL (02-09-20 @ 16:17)      RADIOLOGY & ADDITIONAL TESTS:  CT Chest Non-contrast (2/10)  1. 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 No rupture.   2. Mild multifocal bronchiolitis in the right lung.   3. Pneumobilia. Correlate for history of ERCP with sphincterotomy or similar   biliary intervention. In the absence of such history the presence of gas in the   bile ducts may signify cholangitis or juanita-enteric fistula.   4. There is a fluid attenuation lesion at/near the expected position of the   common bile duct which may be dilated common bile ducts/choledochal cyst or could be a duodenal diverticulum.    CXR (2/9): Cardiomegaly. Grossly clear lungs.    MICROBIOLOGY  Rapid RVP (2/9): hMPV detected. Negative for Flu A/B.    HEALTH ISSUES - PROBLEM Dx:  Need for prophylactic measure: Need for prophylactic measure  Pacemaker: Pacemaker  Upper respiratory tract infection, unspecified type: Upper respiratory tract infection, unspecified type  Congestive heart failure, unspecified HF chronicity, unspecified heart failure type: Congestive heart failure, unspecified HF chronicity, unspecified heart failure type  Hypothyroidism: Hypothyroidism  HLD (hyperlipidemia): HLD (hyperlipidemia)  Afib: Afib  HTN (hypertension): HTN (hypertension)    Consultant(s) Notes Reviewed:  [x  ] YES     Care Discussed with [X] Consultants  [ X ] Patient  [x  ] Family- dtr   ] HCP [x  ]   [x  ] Social Service  [x  ] RN, [  ] Physical Therapy,[  ] Palliative care team  DVT PPX: [  ] Lovenox, [  ] S C Heparin, [  ] Coumadin, [  ] Xarelto, [ X ] Eliquis, [  ] Pradaxa, [  ] IV Heparin drip, [  ] SCD [  ] Contraindication 2 to GI Bleed,[  ] Ambulation [  ] Contraindicated 2 to  bleed [  ] Contraindicated 2 to Brain Bleed  Advanced directive: [ X ] None, [  ] DNR/DNI

## 2020-02-10 NOTE — PROGRESS NOTE ADULT - PROBLEM SELECTOR PLAN 1
Admit to telemetry; Acute on Chronic Diastolic CHF.  -CXR prelim showing vascular congestion.   -Positive for hMPV.   -Chest CT showed multifocal bronchiolitis in R lung.   - s/p Lasix 40mg IV in ER, will continue with Lasix 40mg IVP daily.  -BNP in ER 1454.  -Strict I&O's. Daily weights.  - Cardiology following- Dr. Estrella Admit to telemetry; Acute on Chronic Diastolic CHF.  -CXR showed cardiomegaly, clear lungs.  -Prednisone 20 mg PO started on 2/10 x 5 days.  -Positive for hMPV.   -Chest CT showed multifocal bronchiolitis in R lung.   - s/p Lasix 40mg IV in ER, will continue with Lasix 40mg IVP daily.  -BNP in ER 1454.  -Strict I&O's. Daily weights.  -Cardiology following- Dr. Estrella Admit to telemetry; Acute on Chronic Diastolic CHF.  -CXR showed cardiomegaly, clear lungs.  -Prednisone 20 mg PO started on 2/10 x 5 days.  -Positive for hMPV.   -Chest CT showed multifocal bronchiolitis in R lung.   -Patient received Lasix 40 mg IV x2. Cr increased from 1.10 on 2/9 to 1.30 on 2/10. Will dose Lasix daily based on symptoms 2/2 increase in Cr.   -BNP on admission 1454.  -Strict I&O's. Daily weights.  -Cardiology, Dr. Estrella on board. Admit to telemetry; Acute on Chronic Diastolic CHF.  -CXR showed cardiomegaly, clear lungs.  -Prednisone 20 mg PO started on 2/10 x 5 days.  -Positive for hMPV.   -Chest CT showed multifocal bronchiolitis in R lung.   -Patient received Lasix 40 mg IV x2. Cr increased from 1.10 on 2/9 to 1.30 on 2/10. Discussed with cardiology and will provide Lasix 20 mg PO daily starting 2/11.   -BNP on admission 1454.  -Strict I&O's. Daily weights.  -Cardiology, Dr. Estrella on board. Admit to telemetry; Acute on Chronic Diastolic CHF.  -CXR showed cardiomegaly, clear lungs.    -Positive for hMPV.   -Chest CT showed multifocal bronchiolitis in R lung.   -Patient received Lasix 40 mg IV x2. Cr increased from 1.10 on 2/9 to 1.30 on 2/10. Discussed with cardiology and will provide Lasix 20 mg PO daily starting 2/11.   -BNP on admission 1454.  -Strict I&O's. Daily weights.  -Cardiology, Dr. Estrella on board.

## 2020-02-10 NOTE — PROGRESS NOTE ADULT - ATTENDING COMMENTS
Pt seen, Examined, Case & care plan d/w pt, residents , Dtr -Ladi at detail.  D/W Dr Camara-continue Eliquis 2.5 mg 2x day & other cardiac meds  D/W Pt & Dtr at detail.  D/C Home   Total d/c care time is 45 minutes

## 2020-02-10 NOTE — PROGRESS NOTE ADULT - ASSESSMENT
89y F with h/o AFIB on Eliquis, HTN CHOL, diastolic chf who presents with a chief complaint of cough, phlegm, colored sputum x 2 days. Denies CP or pressure. Recently started on Amoxicillin 2 days ago from PMD for URI symptoms. Pt did not take her lasix dose today (has been taking lasix 40 mg MWFS, 20 mg TTS).    PTT - ( 09 Feb 2020 16:17 )  PTT:29.8 sec  CARDIAC MARKERS ( 09 Feb 2020 16:17 )  .016 ng/mL / x     / 58 U/L / x     / x        Blood Culture:   02-09 @ 16:17  Pro Bnp 1454    RADIOLOGY/EKG: AF with chronic ST-T wave abnormalities, septal infarct pattern t waves chronic    ? PNA with superimposed chf  PLAN  would give 40 mg IV lasix x1  F/U CXR  Pan CX  Pt well known to our office/ Dr. Rogers 89y F with h/o AFIB on Eliquis, HTN CHOL, diastolic chf who presents with a chief complaint of cough, phlegm, colored sputum x 2 days. Denies CP or pressure. Recently started on Amoxicillin 2 days ago from PMD for URI symptoms. Given IV lasix with no clear pleural effusions on CT chest.  Doubt CHF exacerbation at the current time.  Would resume 20mg PO lasix while in hospital and return to home regimen when the patient is home.    Doubt ACS  Likely most symptoms from HMPV Bronchiolitis  No pending cardiac work up necessary at this time but will follow.    Treat with steroids and supportive care  Above discussed with Dr. Mcdonald and patients daughter

## 2020-02-10 NOTE — PROGRESS NOTE ADULT - PROBLEM SELECTOR PLAN 5
-On cardizem  mg daily, Toprol  mg daily  -Continue to monitor routine hemodynamics, well controlled -On Cardizem  mg daily, Toprol  mg daily  -Continue to monitor routine hemodynamics, well controlled

## 2020-02-10 NOTE — PROGRESS NOTE ADULT - PROBLEM SELECTOR PLAN 8
-Patient on Eliquis 2.5mg BID, will continue  -Ambulate with assistance  -PT consult -Patient on Eliquis 2.5mg BID, will continue  -Ambulate with assistance  -PT consult.

## 2020-02-14 LAB
CULTURE RESULTS: SIGNIFICANT CHANGE UP
CULTURE RESULTS: SIGNIFICANT CHANGE UP
SPECIMEN SOURCE: SIGNIFICANT CHANGE UP
SPECIMEN SOURCE: SIGNIFICANT CHANGE UP

## 2020-05-11 NOTE — ED ADULT NURSE NOTE - NS ED NURSE RECORD ANOTHER HT AND WT
Eva Eric called to request a refill on her medication. Last office visit : 11/15/2019   Next office visit : 2020     Last UDS:   Amphetamine Screen, Urine   Date Value Ref Range Status   2019 positive  Final     Barbiturate Screen, Urine   Date Value Ref Range Status   2019 neg  Final     Benzodiazepine Screen, Urine   Date Value Ref Range Status   2019 positive  Final     Buprenorphine Urine   Date Value Ref Range Status   2019 ng  Final     Cocaine Metabolite Screen, Urine   Date Value Ref Range Status   2019 neg  Final     Gabapentin Screen, Urine   Date Value Ref Range Status   2019 neg  Final     MDMA, Urine   Date Value Ref Range Status   2019 neg  Final     Methamphetamine, Urine   Date Value Ref Range Status   2019 neg  Final     Opiate Scrn, Ur   Date Value Ref Range Status   2019 neg  Final     Oxycodone Screen, Ur   Date Value Ref Range Status   2019 neg  Final     PCP Screen, Urine   Date Value Ref Range Status   2019 neg  Final     Propoxyphene Screen, Urine   Date Value Ref Range Status   2019 neg  Final     THC Screen, Urine   Date Value Ref Range Status   2019 neg  Final     Tricyclic Antidepressants, Urine   Date Value Ref Range Status   2019 neg  Final       Requested Prescriptions     Pending Prescriptions Disp Refills    amphetamine-dextroamphetamine (ADDERALL XR) 15 MG extended release capsule 30 capsule 0     Sig: Take 1 capsule by mouth daily for 30 days. Signed Prescriptions Disp Refills    cloNIDine (CATAPRES) 0.1 MG tablet 90 tablet 0     Sig: TAKE 1 TABLET BY MOUTH NIGHTLY AS NEEDED FOR INSOMNIA     Authorizing Provider: Praful Ravi     Ordering User: [address], [address] [employer] and the script for 5-2 is   Please approve or refuse this medication.    Tc Felix LPN Yes

## 2020-10-11 NOTE — PHYSICAL THERAPY INITIAL EVALUATION ADULT - LEVEL OF INDEPENDENCE: SUPINE/SIT, REHAB EVAL
n/a- pt received sitting at EOB
31 yr old m w/ no pmh that presents due to laceration. Pt states that he was using a knife and work when he cut himself. Pt denies any numbness or any other complaints.     Review of Systems    Constitutional: (-) fever  Cardiovascular: (-) chest pain, (-) syncope  Respiratory: (-) cough, (-) shortness of breath  Gastrointestinal: (-) vomiting, (-) diarrhea, (-) abdominal pain  Musculoskeletal: (-) neck pain, (-) back pain, (-) joint pain  Integumentary: (-) rash, (-) edema  Neurological: (-) headache, (-) altered mental status    Except as documented in the HPI, all other systems are negative.    VITAL SIGNS: I have reviewed nursing notes and confirm.  CONSTITUTIONAL: non-toxic, well appearing  SKIN: no rash, no petechiae.  EYES: PERRL, EOMI, pink conjunctiva, anicteric  ENT: tongue midline, no exudates, MMM  NECK: Supple; no meningismus, no JVD  CARD: RRR, no murmurs, equal radial pulses bilaterally 2+  RESP: CTAB, no respiratory distress  ABD: Soft, non-tender, non-distended, no peritoneal signs, no HSM, no CVA tenderness  EXT: Normal ROM x4.   Detailed Left Hand Exam:      Inspection: swelling noted at the distal portion of the distal third left finger. No deformity, bruises, or erythema.      Palpation: No tenderness, warmth, crepitus, deformity, or snuff box tenderness.      Sensation: light touch to radial nerve distribution intact, light touch to median nerve distribution intact, light touch to ulnar nerve distribution intact, and light touch to distal fingers intact.      Motor: PIP ROM WNL, isolated DIP ROM WNL, intraosseous muscles ROM WNL, and wrist ROM WNL.      Perfusion: perfusion WNL. Radial pulse 2+.   There is a 1cm curvilinear laceration involving the distal third left finger, without nailbed involvement. No foreign body visualized    a/p  pt who presents with lacerations to the distal third left finger. Will provide pt with tetanus shot. repair laceration. dc pt with wound care precautions and instructions to return if any signs of infection.

## 2021-06-30 NOTE — PROGRESS NOTE ADULT - PROVIDER SPECIALTY LIST ADULT
Azithromycin Pregnancy And Lactation Text: This medication is considered safe during pregnancy and is also secreted in breast milk. Internal Medicine Doxycycline Pregnancy And Lactation Text: This medication is Pregnancy Category D and not consider safe during pregnancy. It is also excreted in breast milk but is considered safe for shorter treatment courses. Dapsone Counseling: I discussed with the patient the risks of dapsone including but not limited to hemolytic anemia, agranulocytosis, rashes, methemoglobinemia, kidney failure, peripheral neuropathy, headaches, GI upset, and liver toxicity.  Patients who start dapsone require monitoring including baseline LFTs and weekly CBCs for the first month, then every month thereafter.  The patient verbalized understanding of the proper use and possible adverse effects of dapsone.  All of the patient's questions and concerns were addressed. Birth Control Pills Counseling: Birth Control Pill Counseling: I discussed with the patient the potential side effects of OCPs including but not limited to increased risk of stroke, heart attack, thrombophlebitis, deep venous thrombosis, hepatic adenomas, breast changes, GI upset, headaches, and depression.  The patient verbalized understanding of the proper use and possible adverse effects of OCPs. All of the patient's questions and concerns were addressed. Minocycline Pregnancy And Lactation Text: This medication is Pregnancy Category D and not consider safe during pregnancy. It is also excreted in breast milk. Detail Level: Detailed Isotretinoin Counseling: Patient should get monthly blood tests, not donate blood, not drive at night if vision affected, not share medication, and not undergo elective surgery for 6 months after tx completed. Side effects reviewed, pt to contact office should one occur. High Dose Vitamin A Pregnancy And Lactation Text: High dose vitamin A therapy is contraindicated during pregnancy and breast feeding. Bactrim Counseling:  I discussed with the patient the risks of sulfa antibiotics including but not limited to GI upset, allergic reaction, drug rash, diarrhea, dizziness, photosensitivity, and yeast infections.  Rarely, more serious reactions can occur including but not limited to aplastic anemia, agranulocytosis, methemoglobinemia, blood dyscrasias, liver or kidney failure, lung infiltrates or desquamative/blistering drug rashes. Erythromycin Counseling:  I discussed with the patient the risks of erythromycin including but not limited to GI upset, allergic reaction, drug rash, diarrhea, increase in liver enzymes, and yeast infections. Dapsone Pregnancy And Lactation Text: This medication is Pregnancy Category C and is not considered safe during pregnancy or breast feeding. Isotretinoin Pregnancy And Lactation Text: This medication is Pregnancy Category X and is considered extremely dangerous during pregnancy. It is unknown if it is excreted in breast milk. Sarecycline Counseling: Patient advised regarding possible photosensitivity and discoloration of the teeth, skin, lips, tongue and gums.  Patient instructed to avoid sunlight, if possible.  When exposed to sunlight, patients should wear protective clothing, sunglasses, and sunscreen.  The patient was instructed to call the office immediately if the following severe adverse effects occur:  hearing changes, easy bruising/bleeding, severe headache, or vision changes.  The patient verbalized understanding of the proper use and possible adverse effects of sarecycline.  All of the patient's questions and concerns were addressed. Minocycline Counseling: Patient advised regarding possible photosensitivity and discoloration of the teeth, skin, lips, tongue and gums.  Patient instructed to avoid sunlight, if possible.  When exposed to sunlight, patients should wear protective clothing, sunglasses, and sunscreen.  The patient was instructed to call the office immediately if the following severe adverse effects occur:  hearing changes, easy bruising/bleeding, severe headache, or vision changes.  The patient verbalized understanding of the proper use and possible adverse effects of minocycline.  All of the patient's questions and concerns were addressed. Birth Control Pills Pregnancy And Lactation Text: This medication should be avoided if pregnant and for the first 30 days post-partum. Azithromycin Counseling:  I discussed with the patient the risks of azithromycin including but not limited to GI upset, allergic reaction, drug rash, diarrhea, and yeast infections. Bactrim Pregnancy And Lactation Text: This medication is Pregnancy Category D and is known to cause fetal risk.  It is also excreted in breast milk. Erythromycin Pregnancy And Lactation Text: This medication is Pregnancy Category B and is considered safe during pregnancy. It is also excreted in breast milk. Doxycycline Counseling:  Patient counseled regarding possible photosensitivity and increased risk for sunburn.  Patient instructed to avoid sunlight, if possible.  When exposed to sunlight, patients should wear protective clothing, sunglasses, and sunscreen.  The patient was instructed to call the office immediately if the following severe adverse effects occur:  hearing changes, easy bruising/bleeding, severe headache, or vision changes.  The patient verbalized understanding of the proper use and possible adverse effects of doxycycline.  All of the patient's questions and concerns were addressed. High Dose Vitamin A Counseling: Side effects reviewed, pt to contact office should one occur. Include Pregnancy/Lactation Warning?: No Topical Clindamycin Pregnancy And Lactation Text: This medication is Pregnancy Category B and is considered safe during pregnancy. It is unknown if it is excreted in breast milk. Spironolactone Counseling: Patient advised regarding risks of diarrhea, abdominal pain, hyperkalemia, birth defects (for female patients), liver toxicity and renal toxicity. The patient may need blood work to monitor liver and kidney function and potassium levels while on therapy. The patient verbalized understanding of the proper use and possible adverse effects of spironolactone.  All of the patient's questions and concerns were addressed. Benzoyl Peroxide Pregnancy And Lactation Text: This medication is Pregnancy Category C. It is unknown if benzoyl peroxide is excreted in breast milk. Topical Clindamycin Counseling: Patient counseled that this medication may cause skin irritation or allergic reactions.  In the event of skin irritation, the patient was advised to reduce the amount of the drug applied or use it less frequently.   The patient verbalized understanding of the proper use and possible adverse effects of clindamycin.  All of the patient's questions and concerns were addressed. Tazorac Counseling:  Patient advised that medication is irritating and drying.  Patient may need to apply sparingly and wash off after an hour before eventually leaving it on overnight.  The patient verbalized understanding of the proper use and possible adverse effects of tazorac.  All of the patient's questions and concerns were addressed. Benzoyl Peroxide Counseling: Patient counseled that medicine may cause skin irritation and bleach clothing.  In the event of skin irritation, the patient was advised to reduce the amount of the drug applied or use it less frequently.   The patient verbalized understanding of the proper use and possible adverse effects of benzoyl peroxide.  All of the patient's questions and concerns were addressed. Topical Retinoid Pregnancy And Lactation Text: This medication is Pregnancy Category C. It is unknown if this medication is excreted in breast milk. Topical Sulfur Applications Pregnancy And Lactation Text: This medication is Pregnancy Category C and has an unknown safety profile during pregnancy. It is unknown if this topical medication is excreted in breast milk. Tazorac Pregnancy And Lactation Text: This medication is not safe during pregnancy. It is unknown if this medication is excreted in breast milk. Topical Retinoid counseling:  Patient advised to apply a pea-sized amount only at bedtime and wait 30 minutes after washing their face before applying.  If too drying, patient may add a non-comedogenic moisturizer. The patient verbalized understanding of the proper use and possible adverse effects of retinoids.  All of the patient's questions and concerns were addressed. Topical Sulfur Applications Counseling: Topical Sulfur Counseling: Patient counseled that this medication may cause skin irritation or allergic reactions.  In the event of skin irritation, the patient was advised to reduce the amount of the drug applied or use it less frequently.   The patient verbalized understanding of the proper use and possible adverse effects of topical sulfur application.  All of the patient's questions and concerns were addressed. Spironolactone Pregnancy And Lactation Text: This medication can cause feminization of the male fetus and should be avoided during pregnancy. The active metabolite is also found in breast milk. Tetracycline Counseling: Patient counseled regarding possible photosensitivity and increased risk for sunburn.  Patient instructed to avoid sunlight, if possible.  When exposed to sunlight, patients should wear protective clothing, sunglasses, and sunscreen.  The patient was instructed to call the office immediately if the following severe adverse effects occur:  hearing changes, easy bruising/bleeding, severe headache, or vision changes.  The patient verbalized understanding of the proper use and possible adverse effects of tetracycline.  All of the patient's questions and concerns were addressed. Patient understands to avoid pregnancy while on therapy due to potential birth defects. Detail Level: Zone

## 2021-11-16 ENCOUNTER — EMERGENCY (EMERGENCY)
Facility: HOSPITAL | Age: 86
LOS: 1 days | Discharge: HOME | End: 2021-11-16
Attending: STUDENT IN AN ORGANIZED HEALTH CARE EDUCATION/TRAINING PROGRAM
Payer: MEDICARE

## 2021-11-16 VITALS
HEIGHT: 59 IN | HEART RATE: 69 BPM | OXYGEN SATURATION: 97 % | TEMPERATURE: 98 F | RESPIRATION RATE: 18 BRPM | WEIGHT: 119.93 LBS | DIASTOLIC BLOOD PRESSURE: 79 MMHG | SYSTOLIC BLOOD PRESSURE: 152 MMHG

## 2021-11-16 VITALS
RESPIRATION RATE: 20 BRPM | HEART RATE: 78 BPM | SYSTOLIC BLOOD PRESSURE: 132 MMHG | OXYGEN SATURATION: 97 % | DIASTOLIC BLOOD PRESSURE: 74 MMHG

## 2021-11-16 DIAGNOSIS — Z98.890 OTHER SPECIFIED POSTPROCEDURAL STATES: Chronic | ICD-10-CM

## 2021-11-16 DIAGNOSIS — Z95.0 PRESENCE OF CARDIAC PACEMAKER: Chronic | ICD-10-CM

## 2021-11-16 LAB
ALBUMIN SERPL ELPH-MCNC: 3.4 G/DL — SIGNIFICANT CHANGE UP (ref 3.3–5)
ALP SERPL-CCNC: 95 U/L — SIGNIFICANT CHANGE UP (ref 40–120)
ALT FLD-CCNC: 15 U/L — SIGNIFICANT CHANGE UP (ref 10–45)
ANION GAP SERPL CALC-SCNC: 14 MMOL/L — SIGNIFICANT CHANGE UP (ref 5–17)
APTT BLD: 31.3 SEC — SIGNIFICANT CHANGE UP (ref 27.5–35.5)
AST SERPL-CCNC: 40 U/L — SIGNIFICANT CHANGE UP (ref 10–40)
BASOPHILS # BLD AUTO: 0.05 K/UL — SIGNIFICANT CHANGE UP (ref 0–0.2)
BASOPHILS NFR BLD AUTO: 0.5 % — SIGNIFICANT CHANGE UP (ref 0–2)
BILIRUB SERPL-MCNC: 0.7 MG/DL — SIGNIFICANT CHANGE UP (ref 0.2–1.2)
BUN SERPL-MCNC: 22 MG/DL — SIGNIFICANT CHANGE UP (ref 7–23)
CALCIUM SERPL-MCNC: 9.4 MG/DL — SIGNIFICANT CHANGE UP (ref 8.4–10.5)
CHLORIDE SERPL-SCNC: 100 MMOL/L — SIGNIFICANT CHANGE UP (ref 96–108)
CO2 SERPL-SCNC: 26 MMOL/L — SIGNIFICANT CHANGE UP (ref 22–31)
CREAT SERPL-MCNC: 1.08 MG/DL — SIGNIFICANT CHANGE UP (ref 0.5–1.3)
EOSINOPHIL # BLD AUTO: 0.21 K/UL — SIGNIFICANT CHANGE UP (ref 0–0.5)
EOSINOPHIL NFR BLD AUTO: 1.9 % — SIGNIFICANT CHANGE UP (ref 0–6)
GLUCOSE SERPL-MCNC: 103 MG/DL — HIGH (ref 70–99)
HCT VFR BLD CALC: 45.6 % — HIGH (ref 34.5–45)
HGB BLD-MCNC: 14.5 G/DL — SIGNIFICANT CHANGE UP (ref 11.5–15.5)
IMM GRANULOCYTES NFR BLD AUTO: 0.4 % — SIGNIFICANT CHANGE UP (ref 0–1.5)
INR BLD: 1.16 RATIO — SIGNIFICANT CHANGE UP (ref 0.88–1.16)
LYMPHOCYTES # BLD AUTO: 1.03 K/UL — SIGNIFICANT CHANGE UP (ref 1–3.3)
LYMPHOCYTES # BLD AUTO: 9.3 % — LOW (ref 13–44)
MCHC RBC-ENTMCNC: 30.1 PG — SIGNIFICANT CHANGE UP (ref 27–34)
MCHC RBC-ENTMCNC: 31.8 GM/DL — LOW (ref 32–36)
MCV RBC AUTO: 94.8 FL — SIGNIFICANT CHANGE UP (ref 80–100)
MONOCYTES # BLD AUTO: 0.84 K/UL — SIGNIFICANT CHANGE UP (ref 0–0.9)
MONOCYTES NFR BLD AUTO: 7.6 % — SIGNIFICANT CHANGE UP (ref 2–14)
NEUTROPHILS # BLD AUTO: 8.93 K/UL — HIGH (ref 1.8–7.4)
NEUTROPHILS NFR BLD AUTO: 80.3 % — HIGH (ref 43–77)
NRBC # BLD: 0 /100 WBCS — SIGNIFICANT CHANGE UP (ref 0–0)
PLATELET # BLD AUTO: 198 K/UL — SIGNIFICANT CHANGE UP (ref 150–400)
POTASSIUM SERPL-MCNC: 5.4 MMOL/L — HIGH (ref 3.5–5.3)
POTASSIUM SERPL-SCNC: 5.4 MMOL/L — HIGH (ref 3.5–5.3)
PROT SERPL-MCNC: 7.3 G/DL — SIGNIFICANT CHANGE UP (ref 6–8.3)
PROTHROM AB SERPL-ACNC: 13.8 SEC — HIGH (ref 10.6–13.6)
RBC # BLD: 4.81 M/UL — SIGNIFICANT CHANGE UP (ref 3.8–5.2)
RBC # FLD: 14.9 % — HIGH (ref 10.3–14.5)
SODIUM SERPL-SCNC: 140 MMOL/L — SIGNIFICANT CHANGE UP (ref 135–145)
WBC # BLD: 11.1 K/UL — HIGH (ref 3.8–10.5)
WBC # FLD AUTO: 11.1 K/UL — HIGH (ref 3.8–10.5)

## 2021-11-16 PROCEDURE — 70450 CT HEAD/BRAIN W/O DYE: CPT | Mod: 26,MA,77

## 2021-11-16 PROCEDURE — 99285 EMERGENCY DEPT VISIT HI MDM: CPT

## 2021-11-16 PROCEDURE — 70450 CT HEAD/BRAIN W/O DYE: CPT | Mod: 26,MA

## 2021-11-16 PROCEDURE — 85025 COMPLETE CBC W/AUTO DIFF WBC: CPT

## 2021-11-16 PROCEDURE — 72125 CT NECK SPINE W/O DYE: CPT | Mod: MA

## 2021-11-16 PROCEDURE — 70450 CT HEAD/BRAIN W/O DYE: CPT | Mod: MA

## 2021-11-16 PROCEDURE — 90471 IMMUNIZATION ADMIN: CPT

## 2021-11-16 PROCEDURE — 80053 COMPREHEN METABOLIC PANEL: CPT

## 2021-11-16 PROCEDURE — 85730 THROMBOPLASTIN TIME PARTIAL: CPT

## 2021-11-16 PROCEDURE — 96374 THER/PROPH/DIAG INJ IV PUSH: CPT

## 2021-11-16 PROCEDURE — 85610 PROTHROMBIN TIME: CPT

## 2021-11-16 PROCEDURE — 72170 X-RAY EXAM OF PELVIS: CPT

## 2021-11-16 PROCEDURE — 71045 X-RAY EXAM CHEST 1 VIEW: CPT

## 2021-11-16 PROCEDURE — 99284 EMERGENCY DEPT VISIT MOD MDM: CPT

## 2021-11-16 PROCEDURE — 72170 X-RAY EXAM OF PELVIS: CPT | Mod: 26

## 2021-11-16 PROCEDURE — 99284 EMERGENCY DEPT VISIT MOD MDM: CPT | Mod: 25

## 2021-11-16 PROCEDURE — 96375 TX/PRO/DX INJ NEW DRUG ADDON: CPT

## 2021-11-16 PROCEDURE — 72125 CT NECK SPINE W/O DYE: CPT | Mod: 26,MA

## 2021-11-16 PROCEDURE — 71045 X-RAY EXAM CHEST 1 VIEW: CPT | Mod: 26

## 2021-11-16 PROCEDURE — 90715 TDAP VACCINE 7 YRS/> IM: CPT

## 2021-11-16 RX ORDER — PHYTONADIONE (VIT K1) 5 MG
5 TABLET ORAL ONCE
Refills: 0 | Status: COMPLETED | OUTPATIENT
Start: 2021-11-16 | End: 2021-11-16

## 2021-11-16 RX ORDER — PROTHROMBIN COMPLEX CONCENTRATE (HUMAN) 25.5; 16.5; 24; 22; 22; 26 [IU]/ML; [IU]/ML; [IU]/ML; [IU]/ML; [IU]/ML; [IU]/ML
2500 POWDER, FOR SOLUTION INTRAVENOUS ONCE
Refills: 0 | Status: COMPLETED | OUTPATIENT
Start: 2021-11-16 | End: 2021-11-16

## 2021-11-16 RX ORDER — TETANUS TOXOID, REDUCED DIPHTHERIA TOXOID AND ACELLULAR PERTUSSIS VACCINE, ADSORBED 5; 2.5; 8; 8; 2.5 [IU]/.5ML; [IU]/.5ML; UG/.5ML; UG/.5ML; UG/.5ML
0.5 SUSPENSION INTRAMUSCULAR ONCE
Refills: 0 | Status: COMPLETED | OUTPATIENT
Start: 2021-11-16 | End: 2021-11-16

## 2021-11-16 RX ADMIN — PROTHROMBIN COMPLEX CONCENTRATE (HUMAN) 400 INTERNATIONAL UNIT(S): 25.5; 16.5; 24; 22; 22; 26 POWDER, FOR SOLUTION INTRAVENOUS at 19:14

## 2021-11-16 RX ADMIN — Medication 101 MILLIGRAM(S): at 20:53

## 2021-11-16 RX ADMIN — TETANUS TOXOID, REDUCED DIPHTHERIA TOXOID AND ACELLULAR PERTUSSIS VACCINE, ADSORBED 0.5 MILLILITER(S): 5; 2.5; 8; 8; 2.5 SUSPENSION INTRAMUSCULAR at 17:51

## 2021-11-16 NOTE — ED PROVIDER NOTE - PATIENT PORTAL LINK FT
You can access the FollowMyHealth Patient Portal offered by [facility] by registering at the following website: [email or web address]. By joining [email or web address] FollowMyHealth portal, you will also be able to view your health information using other applications (apps) compatible with our system.

## 2021-11-16 NOTE — ED ADULT NURSE REASSESSMENT NOTE - NS ED NURSE REASSESS COMMENT FT1
Pt family member is upset about wait times for ct head, states that all the imaging is not necessary.  MD Pena went into pt room and educated family on recent CT results and necessary policy to repeat CT scan to ensure patient safety.  Pt educated on the wait, and that patient will be here for at least another 4-5 hours.  Pt and family agreed.  Safety and comfort maintained.  Pt is gross neuro intact, +strength and sensation bilaterally, able to follow commands and answer questions correctly.  Wound on back of head is not actively bleeding.  Will continue to monitor.

## 2021-11-16 NOTE — ED PROVIDER NOTE - OBJECTIVE STATEMENT
89yo F PMH atrial fibrillation s/p pacemaker (on Eliquis), hypothyroidism, GERD, hysterectomy, scoliosis presents after a fall. States that she was getting a mammogram, did not have a railing to hold onto, fell back, hit her head. Denies LOC. States that she usually ambulates using a walker. No dizziness, lightheadedness, chest pain, SOB, vision changes prior to fall.

## 2021-11-16 NOTE — CONSULT NOTE ADULT - ASSESSMENT
[place]  89F afib on. eliquis w/ ppm presented after mechanical fall w/ head strike, denies LOC. C/o pain back of head, no n/v/blurry vision. CTH small L cerebellar tSAH w/ occipital bone fx. Exam: AOx3, PERRL, no facial, no drift, KHOURY 5/5, SILT  -no acute neurosurgical intervention  -repeat scan 4h  -kcentra, vit K  -PT 13.8; PTT, INR, plt WNL

## 2021-11-16 NOTE — ED ADULT TRIAGE NOTE - BRAND OF COVID-19 VACCINATION
Blood pressure suboptimally controlled  Patient has a blood pressure monitor at home  I will have him check his blood pressures daily for the next 2 weeks  He will follow up in 2 weeks in his office for blood pressure check  He will bring his home machine in to check for accuracy  Further recommendations based on these results  Moderna dose 1 and 2

## 2021-11-16 NOTE — ED PROVIDER NOTE - PHYSICAL EXAMINATION
G: NAD, cooperative with exam   H: NCAT  E: EOMI, no conjunctival pallor   M: Mucous membranes moist   R: CTABL, nWOB  C: Nl S1/S2, no mrg  A: Soft, NT/ND, no rebound/guarding   MSK: moving all ext spontaneously; no C spine tenderness, no spinal step offs or deformities, pelvis stable, FROM of all joints, bruising noted to the posterior parietal region with some blood, no laceration, no active bleeding

## 2021-11-16 NOTE — ED ADULT NURSE REASSESSMENT NOTE - NS ED NURSE REASSESS COMMENT FT1
Pt and family member educated on plan of care.  agree to wait for repeat CT to ensure patient safety.   Safety and comfort maintained.  Pt repositioned in bed for help with back pain.  Will continue to monitor.

## 2021-11-16 NOTE — ED PROVIDER NOTE - PROGRESS NOTE DETAILS
Attending Becky: called by radiology w/ findings concerning for skull fracture and trace SAH around fracture site. Labs ordered, will discuss w/ NSGY. Attending Becky: spoke w/ trauma who will come eval pt. pending NSGY conversation. Attending Becky: seen by NSGY. Will obtain 4 hour CTH. Pending conversation w/ NSGY attending regarding PCC or kcentra administration. Holley Luis MD (PGY2) -  CTr c/f Tiny left cerebellar subarachnoid hemorrhage.  Left occipital bone fracture extending down to the left posterior skull base near the foramen magnum. The fracture line appears to extend across the left transverse sinus. There are small air-fluid levels are noted in the sphenoid sinus. Consider additional imaging.     NSG came to bedside, recommend repeat CTH 4hr post initial CT. Pt comfortable on bed. Daughter at bedside. We discussed the results of ED workup with the patient including the need for further monitoring. Time was taken to answer any questions that the patient/family had. Attending Becky: ZHANG requesting kcentra and vit K. Attending Becky: pt going for repeat CT now Attending Becky: seen Attending Becky: seen by both trauma and NSGY. Trauma recommending pt stay 24 hours for observation. NSGY final recs pending repeat CTH read. Pt and daughter requesting to leave prior to CT results. Informed pt of CTH still waiting to be read and that regardless the trauma team wants her to stay for 24 hour abs. Pt and daughter stating they want to go home. Informed them that she could develop severe bleeding resulting in permanent disability or death. Pt A&O x3, shows understanding. Verbalizes acceptance of that risk. Informed pt and daughter that they could return to the ED at any time for further evaluation if she changed her mind. Informed both trauma service and NSGY team of pt leaving AMA. Per NSGY pt to hold eliquis until outpatient f/u w/ NSGY and no need for anti epileptic medications, informed pt and daughter of this. Provided f/u information for Dr. Stevens and directions to call to make an appointment. Informed them to seek immediate medical care for any changes in mental status, neurological deficits, or any other concerning symptoms. AMA paperwork to be placed in chart. Attending Becky: CT now resulted, report states unchanged SAH.

## 2021-11-16 NOTE — ED PROVIDER NOTE - NS ED ROS FT
Gen: No F/C/NS  Head: +falls, + trauma  Eyes: No changes in vision   Resp: No cough or trouble breathing  Cardiovascular: No chest pain or palpitation  Gastroenteric: No N/V/D  :  No change in urine output, dysuria or hematuria   MS: No joint or muscle pain  Neuro: No headache   Skin: No new rash

## 2021-11-16 NOTE — CONSULT NOTE ADULT - SUBJECTIVE AND OBJECTIVE BOX
p ([id number])     HPI:  89yo F PMH atrial fibrillation s/p pacemaker (on Eliquis), hypothyroidism, GERD, hysterectomy, scoliosis presents after a fall. States that she was getting a mammogram, did not have a railing to hold onto, fell back, hit her head. Denies LOC. States that she usually ambulates using a walker. No dizziness, lightheadedness, chest pain, SOB, vision changes prior to fall.    Imaging:  CTH small L cerebellar tSAH w/ occipital bone fx.     Exam:  AOx3, PERRL, no facial, no drift, KHOURY 5/5, SILT.    --Anticoagulation:    =====================  PAST MEDICAL HISTORY     PAST SURGICAL HISTORY         MEDICATIONS:  Antibiotics:    Neuro:    Other:      SOCIAL HISTORY:   Occupation:   Marital Status:     FAMILY HISTORY:      ROS: Negative except per HPI    LABS:  PT/INR - ( 16 Nov 2021 17:26 )   PT: 13.8 sec;   INR: 1.16 ratio         PTT - ( 16 Nov 2021 17:26 )  PTT:31.3 sec                        14.5   11.10 )-----------( 198      ( 16 Nov 2021 17:26 )             45.6

## 2021-11-16 NOTE — ED ADULT NURSE NOTE - OBJECTIVE STATEMENT
89 yo F pmh of brought in via EMS to ED from radiology s/p fall while getting a mamogram.  As per EMS, pt uses walker at baseline, and was not able to hold onto anything during the imaging, lost her balance and fell backwards hitting the back of her head.  Pt states that she hit the back of her head but denies LOC.  Denies CP, back pain, SOB, fevers/chills, n/v/d, lightheadedness, dizziness, changes in urinary or bowel habits. A&Ox4, gross neuro intact, steady gait noted with 1 person assist.  Able to follow commands and answer questions correctly. 89 yo F pmh of brought in via EMS to ED from radiology s/p fall while getting a mamogram.  As per EMS, pt uses walker at baseline, and was not able to hold onto anything during the imaging, lost her balance and fell backwards hitting the back of her head.  Pt states that she hit the back of her head but denies LOC.  Denies CP, back pain, SOB, fevers/chills, n/v/d, lightheadedness, dizziness, changes in urinary or bowel habits. A&Ox4, gross neuro intact, steady gait noted with 1 person assist.  Able to follow commands and answer questions correctly.  Skin w/d, noted wound on the back of the head, no bleeding noted.  Safety and comfort maintained. Will continue to monitor.

## 2021-11-16 NOTE — CONSULT NOTE ADULT - SUBJECTIVE AND OBJECTIVE BOX
GENERAL SURGERY TRAUMA SERVICE - CONSULT NOTE  --------------------------------------------------------------------------------------------    TRAUMA ACTIVATION LEVEL:     MECHANISM OF INJURY:      [x] Blunt:     [] Motor vehicle collision       [x] Fall       [] Pedestrian struck	      [] Motorcycle accident      [] Penetrating:     [] Gun shot wound       [] Stab wound    CHIEF COMPLAINT: Patient is a 89y old  Female who presents with a chief complaint of fall from mammogram    HPI:  70M     No fevers/chills, nausea/vomiting, chest pain/shortness of breath, or dizziness/lightheadedness.    PRIMARY SURVEY:   A - airway intact  B - bilateral breath sounds and good chest rise  C - initial BP  BP: 138/96 (11-16-21 @ 16:05) , HR HR: 69 (11-16-21 @ 16:05)  , palpable pulses in all extremities  D - GCS 15 on arrival. E 4, V 5, M 6.   Exposure obtained    SECONDARY SURVEY:  General: NAD  HEENT: Normocephalic, atraumatic, EOMI, PEERLA  Neck: Soft, midline trachea  Chest: No chest wall tenderness  Cardiac: S1, S2, RRR  Respiratory: Bilateral breath sounds clear and equal   Abdomen: Soft, non-distended, non-tender; no rebound or guarding; no palpable masses   Groin: Normal appearing  Extremities: Palpable radial & DP pulses bilaterally. Motor and sensory grossly intact in all 4 extremities.  Back: No TTP; no palpable runoff/stepoff/deformity    ROS: 10-system review all negative except as noted in HPI.      PAST MEDICAL & SURGICAL HISTORY:    FAMILY HISTORY:  : Non-contributory, as reviewed with the patient and family.    SOCIAL HISTORY:    Non- smoker  Denies Hx of EtOH abuse  Denies ilicit drug use      ALLERGIES: No Known Allergies      HOME MEDICATIONS:  Home Medications:      CURRENT MEDICATIONS  MEDICATIONS:  ---NEURO-  ---CV-  ---PULM-  ---GI/FEN-  phytonadione  IVPB 5 milliGRAM(s) IV Intermittent once  ----  ---ID-   ---ENDO-  ---HEME-  ---OTHER-        --------------------------------------------------------------------------------------------    VITALS:   T(C): 36.4 (11-16-21 @ 15:38), Max: 36.4 (11-16-21 @ 15:38)  HR: 69 (11-16-21 @ 16:05) (69 - 69)  BP: 138/96 (11-16-21 @ 16:05) (138/96 - 152/79)  RR: 18 (11-16-21 @ 16:05) (18 - 18)  SpO2: 96% (11-16-21 @ 16:05) (96% - 97%)  CAPILLARY BLOOD GLUCOSE        CAPILLARY BLOOD GLUCOSE          Height (cm): 149.9 (11-16 @ 15:38)  Weight (kg): 54.4 (11-16 @ 15:38)  BMI (kg/m2): 24.2 (11-16 @ 15:38)  BSA (m2): 1.48 (11-16 @ 15:38)    PHYSICAL EXAM:  General: NAD  HEENT: Normocephalic, atraumatic, EOMI, PEERLA.  Neck: Soft, midline trachea.  Chest: No chest wall tenderness.   Cardiac: S1, S2, RRR  Respiratory: Bilateral breath sounds clear and equal  Abdomen: Soft, non-distended, non-tender; no rebound or guarding  Groin: Normal appearing  Ext: Palpable radial & DP pulses bilaterally   Back: No TTP; no palpable runoff/stepoff/deformity    --------------------------------------------------------------------------------------------    LABS  CBC (11-16 @ 17:26)                              14.5                           11.10<H>  )----------------(  198        80.3<H>% Neutrophils, 9.3<L>% Lymphocytes, ANC: 8.93<H>                              45.6<H>    BMP (11-16 @ 17:26)             140     |  100     |  22    		Ca++ --      Ca 9.4                ---------------------------------( 103<H>		Mg --                 5.4<H>  |  26      |  1.08  			Ph --        LFTs (11-16 @ 17:26)      TPro 7.3 / Alb 3.4 / TBili 0.7 / DBili -- / AST 40 / ALT 15 / AlkPhos 95    Coags (11-16 @ 17:26)  aPTT 31.3 / INR 1.16 / PT 13.8<H>          --------------------------------------------------------------------------------------------    MICROBIOLOGY      --------------------------------------------------------------------------------------------    IMAGING    --------------------------------------------------------------------------------------------    ASSESSMENT:    PLAN:   GENERAL SURGERY TRAUMA SERVICE - CONSULT NOTE  --------------------------------------------------------------------------------------------    TRAUMA ACTIVATION LEVEL:     MECHANISM OF INJURY:      [x] Blunt:     [] Motor vehicle collision       [x] Fall       [] Pedestrian struck	      [] Motorcycle accident      [] Penetrating:     [] Gun shot wound       [] Stab wound    CHIEF COMPLAINT: Patient is a 89y old  Female who presents with a chief complaint of fall from mammogram    HPI:  70M     No fevers/chills, nausea/vomiting, chest pain/shortness of breath, or dizziness/lightheadedness.    PRIMARY SURVEY:   A - airway intact  B - bilateral breath sounds and good chest rise  C - initial BP  BP: 138/96 (11-16-21 @ 16:05) , HR HR: 69 (11-16-21 @ 16:05)  , palpable pulses in all extremities  D - GCS 15 on arrival. E 4, V 5, M 6.   Exposure obtained    SECONDARY SURVEY:  General: NAD  HEENT: Normocephalic, atraumatic, EOMI, PEERLA  Neck: Soft, midline trachea  Chest: No chest wall tenderness  Cardiac: S1, S2, RRR  Respiratory: Bilateral breath sounds clear and equal   Abdomen: Soft, non-distended, non-tender; no rebound or guarding; no palpable masses   Groin: Normal appearing  Extremities: Palpable radial & DP pulses bilaterally. Motor and sensory grossly intact in all 4 extremities.  Back: No TTP; no palpable runoff/stepoff/deformity    ROS: 10-system review all negative except as noted in HPI.      PAST MEDICAL & SURGICAL HISTORY:    FAMILY HISTORY:  : Non-contributory, as reviewed with the patient and family.    SOCIAL HISTORY:    Non- smoker  Denies Hx of EtOH abuse  Denies ilicit drug use      ALLERGIES: No Known Allergies      HOME MEDICATIONS:  Home Medications:      CURRENT MEDICATIONS  MEDICATIONS:  ---NEURO-  ---CV-  ---PULM-  ---GI/FEN-  phytonadione  IVPB 5 milliGRAM(s) IV Intermittent once  ----  ---ID-   ---ENDO-  ---HEME-  ---OTHER-        --------------------------------------------------------------------------------------------    VITALS:   T(C): 36.4 (11-16-21 @ 15:38), Max: 36.4 (11-16-21 @ 15:38)  HR: 69 (11-16-21 @ 16:05) (69 - 69)  BP: 138/96 (11-16-21 @ 16:05) (138/96 - 152/79)  RR: 18 (11-16-21 @ 16:05) (18 - 18)  SpO2: 96% (11-16-21 @ 16:05) (96% - 97%)  CAPILLARY BLOOD GLUCOSE        CAPILLARY BLOOD GLUCOSE          Height (cm): 149.9 (11-16 @ 15:38)  Weight (kg): 54.4 (11-16 @ 15:38)  BMI (kg/m2): 24.2 (11-16 @ 15:38)  BSA (m2): 1.48 (11-16 @ 15:38)    PHYSICAL EXAM:  General: NAD  HEENT: Normocephalic, atraumatic, EOMI, PEERLA.  Neck: Soft, midline trachea.  Chest: No chest wall tenderness.   Cardiac: S1, S2, RRR  Respiratory: Bilateral breath sounds clear and equal  Abdomen: Soft, non-distended, non-tender; no rebound or guarding  Groin: Normal appearing  Ext: Palpable radial & DP pulses bilaterally   Back: No TTP; no palpable runoff/stepoff/deformity    --------------------------------------------------------------------------------------------    LABS  CBC (11-16 @ 17:26)                              14.5                           11.10<H>  )----------------(  198        80.3<H>% Neutrophils, 9.3<L>% Lymphocytes, ANC: 8.93<H>                              45.6<H>    BMP (11-16 @ 17:26)             140     |  100     |  22    		Ca++ --      Ca 9.4                ---------------------------------( 103<H>		Mg --                 5.4<H>  |  26      |  1.08  			Ph --        LFTs (11-16 @ 17:26)      TPro 7.3 / Alb 3.4 / TBili 0.7 / DBili -- / AST 40 / ALT 15 / AlkPhos 95    Coags (11-16 @ 17:26)  aPTT 31.3 / INR 1.16 / PT 13.8<H>          --------------------------------------------------------------------------------------------    MICROBIOLOGY      --------------------------------------------------------------------------------------------    IMAGING    --------------------------------------------------------------------------------------------     GENERAL SURGERY TRAUMA SERVICE - CONSULT NOTE  --------------------------------------------------------------------------------------------  [id number]    TRAUMA ACTIVATION LEVEL:     MECHANISM OF INJURY:      [x] Blunt:     [] Motor vehicle collision       [x] Fall       [] Pedestrian struck	      [] Motorcycle accident      [] Penetrating:     [] Gun shot wound       [] Stab wound    CHIEF COMPLAINT: Patient is a 89y old  Female who presents with a chief complaint of fall from mammogram    HPI:  70M     No fevers/chills, nausea/vomiting, chest pain/shortness of breath, or dizziness/lightheadedness.    PRIMARY SURVEY:   A - airway intact  B - bilateral breath sounds and good chest rise  C - initial BP  BP: 138/96 (11-16-21 @ 16:05) , HR HR: 69 (11-16-21 @ 16:05)  , palpable pulses in all extremities  D - GCS 15 on arrival. E 4, V 5, M 6.   Exposure obtained    SECONDARY SURVEY:  General: NAD  HEENT: Normocephalic, atraumatic, EOMI, PEERLA  Neck: Soft, midline trachea, no C-spine tenderness  Chest: No chest wall tenderness  Cardiac: S1, S2, RRR  Respiratory: 500 on IS, breathing without use of accessory muscles  Abdomen: Soft, non-distended, non-tender; no rebound or guarding; no palpable masses   Groin: Normal appearing  Extremities: Palpable radial & DP pulses bilaterally. Motor and sensory grossly intact in all 4 extremities.  Back: No TTP; no palpable runoff/stepoff/deformity    ROS: 10-system review all negative except as noted in HPI.      PAST MEDICAL & SURGICAL HISTORY:  Hyperlipidemia    FAMILY HISTORY:  : Non-contributory, as reviewed with the patient and family.    SOCIAL HISTORY:    Non- smoker  Denies Hx of EtOH abuse  Denies ilicit drug use      ALLERGIES: No Known Allergies      HOME MEDICATIONS:  Eliquis  Lasix  Diltiazem  Levothyroxine  Metoprolol  Montelukast  Omeprazole    --------------------------------------------------------------------------------------------    VITALS:   T(C): 36.4 (11-16-21 @ 15:38), Max: 36.4 (11-16-21 @ 15:38)  HR: 69 (11-16-21 @ 16:05) (69 - 69)  BP: 138/96 (11-16-21 @ 16:05) (138/96 - 152/79)  RR: 18 (11-16-21 @ 16:05) (18 - 18)  SpO2: 96% (11-16-21 @ 16:05) (96% - 97%)  CAPILLARY BLOOD GLUCOSE        CAPILLARY BLOOD GLUCOSE          Height (cm): 149.9 (11-16 @ 15:38)  Weight (kg): 54.4 (11-16 @ 15:38)  BMI (kg/m2): 24.2 (11-16 @ 15:38)  BSA (m2): 1.48 (11-16 @ 15:38)    PHYSICAL EXAM:  General: NAD  HEENT: Normocephalic, atraumatic, EOMI, PEERLA  Neck: Soft, midline trachea, no C-spine tenderness  Chest: No chest wall tenderness  Cardiac: S1, S2, RRR  Respiratory: 500 on IS, breathing without use of accessory muscles  Abdomen: Soft, non-distended, non-tender; no rebound or guarding; no palpable masses   Groin: Normal appearing  Extremities: Palpable radial & DP pulses bilaterally. Motor and sensory grossly intact in all 4 extremities.  Back: No TTP; no palpable runoff/stepoff/deformity    --------------------------------------------------------------------------------------------    LABS  CBC (11-16 @ 17:26)                              14.5                           11.10<H>  )----------------(  198        80.3<H>% Neutrophils, 9.3<L>% Lymphocytes, ANC: 8.93<H>                              45.6<H>    BMP (11-16 @ 17:26)             140     |  100     |  22    		Ca++ --      Ca 9.4                ---------------------------------( 103<H>		Mg --                 5.4<H>  |  26      |  1.08  			Ph --        LFTs (11-16 @ 17:26)      TPro 7.3 / Alb 3.4 / TBili 0.7 / DBili -- / AST 40 / ALT 15 / AlkPhos 95    Coags (11-16 @ 17:26)  aPTT 31.3 / INR 1.16 / PT 13.8<H>          --------------------------------------------------------------------------------------------    MICROBIOLOGY      --------------------------------------------------------------------------------------------    IMAGING  < from: CT Head No Cont (11.16.21 @ 16:26) >  IMPRESSIONS:    Head CT: Tiny left cerebellar subarachnoid hemorrhage.  Left occipital bone fracture extending down to the left posterior skull base near the foramen magnum. The fracture line appears to extend across the left transverse sinus. There are small air-fluid levels are noted in the sphenoid sinus. Consider additional imaging. At least a short-term follow-up CT is recommended. MRI may be helpful for further evaluation, if clinically indicated.    C-spine CT:  No acute fracture in the cervical spine.    Discussed with Dr. Pena in the ED at 4:52 PM and again at 4:56 PM.      < end of copied text >      -------------------------------------------------------------------------------------------     GENERAL SURGERY TRAUMA SERVICE - CONSULT NOTE  --------------------------------------------------------------------------------------------  [id number]    TRAUMA ACTIVATION LEVEL:     MECHANISM OF INJURY:      [x] Blunt:     [] Motor vehicle collision       [x] Fall       [] Pedestrian struck	      [] Motorcycle accident      [] Penetrating:     [] Gun shot wound       [] Stab wound    CHIEF COMPLAINT: Patient is a 89y old  Female who presents with a chief complaint of fall from mammogram    HPI:  89F pmh afib on eliquis s/p ppm, HTN, Hypothyroid, HLD, GERD presents as trauma consult after fall off of mammogram.    Patient had head strike, no LOC, remembers the entire event. BIBEMS from mammogram. Denies pain in any location on interview. Denies dizziness, chest pain, sob, fainting.    PRIMARY SURVEY:   A - airway intact  B - bilateral breath sounds and good chest rise  C - initial BP  BP: 138/96 (11-16-21 @ 16:05) , HR HR: 69 (11-16-21 @ 16:05)  , palpable pulses in all extremities  D - GCS 15 on arrival. E 4, V 5, M 6.   Exposure obtained    SECONDARY SURVEY:  General: NAD  HEENT: Normocephalic, atraumatic, EOMI, PEERLA  Neck: Soft, midline trachea, no C-spine tenderness  Chest: No chest wall tenderness  Cardiac: S1, S2, RRR  Respiratory: 500 on IS, breathing without use of accessory muscles  Abdomen: Soft, non-distended, non-tender; no rebound or guarding; no palpable masses   Groin: Normal appearing  Extremities: Palpable radial & DP pulses bilaterally. Motor and sensory grossly intact in all 4 extremities.  Back: No TTP; no palpable runoff/stepoff/deformity    ROS: 10-system review all negative except as noted in HPI.      PAST MEDICAL & SURGICAL HISTORY:  Hyperlipidemia    FAMILY HISTORY:  : Non-contributory, as reviewed with the patient and family.    SOCIAL HISTORY:    Non- smoker  Denies Hx of EtOH abuse  Denies ilicit drug use      ALLERGIES: No Known Allergies      HOME MEDICATIONS:  Eliquis  Lasix  Diltiazem  Levothyroxine  Metoprolol  Montelukast  Omeprazole    --------------------------------------------------------------------------------------------    VITALS:   T(C): 36.4 (11-16-21 @ 15:38), Max: 36.4 (11-16-21 @ 15:38)  HR: 69 (11-16-21 @ 16:05) (69 - 69)  BP: 138/96 (11-16-21 @ 16:05) (138/96 - 152/79)  RR: 18 (11-16-21 @ 16:05) (18 - 18)  SpO2: 96% (11-16-21 @ 16:05) (96% - 97%)  CAPILLARY BLOOD GLUCOSE        CAPILLARY BLOOD GLUCOSE          Height (cm): 149.9 (11-16 @ 15:38)  Weight (kg): 54.4 (11-16 @ 15:38)  BMI (kg/m2): 24.2 (11-16 @ 15:38)  BSA (m2): 1.48 (11-16 @ 15:38)    PHYSICAL EXAM:  General: NAD  HEENT: Normocephalic, atraumatic, EOMI, PEERLA  Neck: Soft, midline trachea, no C-spine tenderness  Chest: No chest wall tenderness  Cardiac: S1, S2, RRR  Respiratory: 500 on IS, breathing without use of accessory muscles  Abdomen: Soft, non-distended, non-tender; no rebound or guarding; no palpable masses   Groin: Normal appearing  Extremities: Palpable radial & DP pulses bilaterally. Motor and sensory grossly intact in all 4 extremities.  Back: No TTP; no palpable runoff/stepoff/deformity    --------------------------------------------------------------------------------------------    LABS  CBC (11-16 @ 17:26)                              14.5                           11.10<H>  )----------------(  198        80.3<H>% Neutrophils, 9.3<L>% Lymphocytes, ANC: 8.93<H>                              45.6<H>    BMP (11-16 @ 17:26)             140     |  100     |  22    		Ca++ --      Ca 9.4                ---------------------------------( 103<H>		Mg --                 5.4<H>  |  26      |  1.08  			Ph --        LFTs (11-16 @ 17:26)      TPro 7.3 / Alb 3.4 / TBili 0.7 / DBili -- / AST 40 / ALT 15 / AlkPhos 95    Coags (11-16 @ 17:26)  aPTT 31.3 / INR 1.16 / PT 13.8<H>          --------------------------------------------------------------------------------------------    MICROBIOLOGY      --------------------------------------------------------------------------------------------    IMAGING  < from: CT Head No Cont (11.16.21 @ 16:26) >  IMPRESSIONS:    Head CT: Tiny left cerebellar subarachnoid hemorrhage.  Left occipital bone fracture extending down to the left posterior skull base near the foramen magnum. The fracture line appears to extend across the left transverse sinus. There are small air-fluid levels are noted in the sphenoid sinus. Consider additional imaging. At least a short-term follow-up CT is recommended. MRI may be helpful for further evaluation, if clinically indicated.    C-spine CT:  No acute fracture in the cervical spine.    Discussed with Dr. Pena in the ED at 4:52 PM and again at 4:56 PM.      < end of copied text >      -------------------------------------------------------------------------------------------

## 2021-11-16 NOTE — ED PROVIDER NOTE - CARE PROVIDER_API CALL
Bernard Byrne)  Neurosurgery  [address], [address]  [place] [address]  Phone: [phone]  Fax: [phone]  Follow Up Time:

## 2021-11-16 NOTE — ED ADULT NURSE NOTE - NSIMPLEMENTINTERV_GEN_ALL_ED
Implemented All Fall with Harm Risk Interventions:  [place] to call system. Call bell, personal items and telephone within reach. Instruct patient to call for assistance. Room bathroom lighting operational. Non-slip footwear when patient is off stretcher. Physically safe environment: no spills, clutter or unnecessary equipment. Stretcher in lowest position, wheels locked, appropriate side rails in place. Provide visual cue, wrist band, yellow gown, etc. Monitor gait and stability. Monitor for mental status changes and reorient to person, place, and time. Review medications for side effects contributing to fall risk. Reinforce activity limits and safety measures with patient and family. Provide visual clues: red socks.

## 2021-11-16 NOTE — ED PROVIDER NOTE - ATTENDING CONTRIBUTION TO CARE
Attending Becky: I performed a history and physical exam of the patient and discussed their management with the resident/fellow/ACP/student. I have reviewed the resident/fellow/ACP/student note and agree with the documented findings and plan of care, except as noted. My medical decision making and observations are found above. Please refer to any progress notes for updates on clinical course.

## 2021-11-16 NOTE — ED PROVIDER NOTE - CLINICAL SUMMARY MEDICAL DECISION MAKING FREE TEXT BOX
89yo F PMH atrial fibrillation s/p pacemaker (on Eliquis), hypothyroidism, GERD, hysterectomy, scoliosis presents after a fall. Plan to obtain CT head/neck, reassess. 89yo F PMH atrial fibrillation s/p pacemaker (on Eliquis), hypothyroidism, GERD, hysterectomy, scoliosis presents after a fall. Plan to obtain CT head/neck, reassess.    Attending Becky: 89 y/o F w/ PMH of a fib on eliquis, hypothyroid, GERD, pacemaker presenting w/ fall. Seen in [place], w/ daughter. Was getting mammogram today when she lost her balance and fell backwards, striking her head. Denies LOC. Is on eliquis. Normally uses walker to ambulate. Lost balance while test was being performed because she had nothing to hold on to. Was assisted to stretcher, has not walked since. Denies pre-fall symptoms. Endorsing only pain at back of head. On exam overall well appearing, no acute distress. Head w/ small posterior abrasion. CN 2-12 grossly intact b/l. Neck w/ good ROM. No midline spinal tenderness. Lungs clear. HR regular rate. Abd nondistended/soft/nontender. Str 5/5 x4. No appreciable sensory deficits. Given fall on AC, will obtain CTH to eval for intracranial pathology. CT c-spine to r/o fracture but suspicion is low given lack of findings. Will update tetanus. CXR and pelvis xray to screen for traumatic sequelae. Will reassess the need for additional interventions as clinically warranted. 89yo F PMH atrial fibrillation s/p pacemaker (on Eliquis), hypothyroidism, GERD, hysterectomy, scoliosis presents after a fall. Plan to obtain CT head/neck, reassess.    Attending Becky: 89 y/o F w/ PMH of a fib on eliquis, hypothyroid, GERD, pacemaker presenting w/ fall. Seen in [place], w/ daughter. Was getting mammogram today when she lost her balance and fell backwards, striking her head. Denies LOC. Is on eliquis. Normally uses walker to ambulate. Lost balance while test was being performed because she had nothing to hold on to. Was assisted to stretcher, has not walked since. Denies pre-fall symptoms. Endorsing only pain at back of head. On exam overall well appearing, no acute distress. Head w/ small posterior abrasion. A&O x3, able to make needs known. CN 2-12 grossly intact b/l. Neck w/ good ROM. No midline spinal tenderness. Lungs clear. HR regular rate. Abd nondistended/soft/nontender. Str 5/5 x4. No appreciable sensory deficits. Given fall on AC, will obtain CTH to eval for intracranial pathology. CT c-spine to r/o fracture but suspicion is low given lack of findings. Will update tetanus. CXR and pelvis xray to screen for traumatic sequelae. Will reassess the need for additional interventions as clinically warranted.

## 2021-11-16 NOTE — ED PROVIDER NOTE - NSFOLLOWUPINSTRUCTIONS_ED_ALL_ED_FT
1) Follow up with your doctor this week. Please call the neurosurgeon to make an appointment to be seen in 2 weeks (or sooner if they would like to be seen sooner). You were provided with the office information.  2) Return to the ED immediately for new or worsening symptoms   3) Please continue to take any home medications as prescribed EXCEPT your eliquis and any other blood thinning medication. Please do not take your eliquis until instructed to resume it by your doctor and the neurosurgeon.  4) Your test results from your ED visit were discussed with you prior to discharge  5) You were provided with a copy of your test results  6) Please take Tylenol 650 mg every 4 hours as needed for pain. Please do not exceed more than 4,000mg of Tylenol in a day     You were found to have bleeding in your brain, called a subarachnoid hemorrhage.    You were also found to have a skull fracture.    You left the hospital against medical advice, however if you change your mind at any point you can return to the hospital for further treatment.

## 2021-11-16 NOTE — ED ADULT NURSE REASSESSMENT NOTE - NS ED NURSE REASSESS COMMENT FT1
pt family member upset with wait for repeat CT head results.  Pt and family member was educated on policy for repeat CT head imaging to ensure safety for the patient.  Family member wants to take patient home despite not having results back yet.  MD Pena made aware and will talk to family.

## 2021-11-16 NOTE — CONSULT NOTE ADULT - ASSESSMENT
ASSESSMENT:    PLAN:  - Recommending CXR to rule out rib fx  - Trial of ambulation with walker if patient comfortable  - If refusing CXR, would be AMA to leave hospital     ASSESSMENT:  89F HTN, afib on eliquis w ppm, sinusitis presenting after mechanical fall off mammaogram machine found with the following injuries:    - Left cerebellar SAH  - Left occipital bone fx    PLAN:  - Recommending CXR to rule out rib fx  - Follow CTH  - Trial of ambulation with walker if patient comfortable, patient should not leave without being able to ambulate  - Patient should stay in house for observation for at least 24 hours given age and head bleed    Norbert Carbajal  PGY-2  ATP  [id number]

## 2021-11-17 PROBLEM — K57.10 DIVERTICULOSIS OF SMALL INTESTINE WITHOUT PERFORATION OR ABSCESS WITHOUT BLEEDING: Chronic | Status: ACTIVE | Noted: 2020-02-10

## 2021-11-17 PROBLEM — I48.91 UNSPECIFIED ATRIAL FIBRILLATION: Chronic | Status: ACTIVE | Noted: 2020-02-09

## 2021-11-17 PROBLEM — E03.9 HYPOTHYROIDISM, UNSPECIFIED: Chronic | Status: ACTIVE | Noted: 2020-02-09

## 2021-11-17 PROBLEM — I10 ESSENTIAL (PRIMARY) HYPERTENSION: Chronic | Status: ACTIVE | Noted: 2020-02-09

## 2021-11-17 PROBLEM — I50.9 HEART FAILURE, UNSPECIFIED: Chronic | Status: ACTIVE | Noted: 2020-02-09

## 2021-11-17 PROBLEM — E78.5 HYPERLIPIDEMIA, UNSPECIFIED: Chronic | Status: ACTIVE | Noted: 2020-02-09

## 2021-12-21 ENCOUNTER — RESULT REVIEW (OUTPATIENT)
Age: 86
End: 2021-12-21

## 2022-01-28 ENCOUNTER — RESULT REVIEW (OUTPATIENT)
Age: 87
End: 2022-01-28

## 2022-10-25 ENCOUNTER — RESULT REVIEW (OUTPATIENT)
Age: 87
End: 2022-10-25

## 2022-11-29 ENCOUNTER — RESULT REVIEW (OUTPATIENT)
Age: 87
End: 2022-11-29

## 2024-02-09 NOTE — ED ADULT TRIAGE NOTE - CADM TRG TX PRIOR TO ARRIVAL
none
72-year-old female history of renal cancer status post nephrectomy, hypertension, diabetes, high cholesterol, hypothyroid, H. pylori (treated) status post recent colonoscopy 2 weeks ago that showed polyps but was otherwise negative complaining of 3 days of epigastric pain with some radiation up to her chest that feels like a burning sensation and sometimes radiating to the lower abdomen without radiation to the back.  Patient with intermittent nausea.  Patient states eating makes her pain better.  No change with exertion.  No associated shortness of breath, palpitations, vomiting, diarrhea, black or bloody stools.  Daughter states the patient GI wanted to do an endoscopy but it was not covered by her insurance at the time.  Patient is receiving relief briefly with Pepcid and   Mylanta.  History via daughter interpreting per patient's preference.

## 2024-06-03 NOTE — ED ADULT NURSE NOTE - NS ED PATIENT SAFETY CONCERN
Unable to assess due to medical condition Mohs Rapid Report Verbiage: The area of clinically evident tumor was marked with skin marking ink and appropriately hatched.  The initial incision was made following the Mohs approach through the skin.  The specimen was taken to the lab, divided into the necessary number of pieces, chromacoded and processed according to the Mohs protocol.  This was repeated in successive stages until a tumor free defect was achieved.